# Patient Record
Sex: FEMALE | Race: WHITE | NOT HISPANIC OR LATINO | URBAN - METROPOLITAN AREA
[De-identification: names, ages, dates, MRNs, and addresses within clinical notes are randomized per-mention and may not be internally consistent; named-entity substitution may affect disease eponyms.]

---

## 2017-09-21 ENCOUNTER — OFFICE VISIT (OUTPATIENT)
Dept: OPHTHALMOLOGY | Facility: CLINIC | Age: 57
End: 2017-09-21
Attending: OPHTHALMOLOGY
Payer: MEDICARE

## 2017-09-21 DIAGNOSIS — Z79.4 TYPE 2 DIABETES MELLITUS WITH RIGHT EYE AFFECTED BY MILD NONPROLIFERATIVE RETINOPATHY WITHOUT MACULAR EDEMA, WITH LONG-TERM CURRENT USE OF INSULIN (H): Primary | ICD-10-CM

## 2017-09-21 DIAGNOSIS — E11.3291 TYPE 2 DIABETES MELLITUS WITH RIGHT EYE AFFECTED BY MILD NONPROLIFERATIVE RETINOPATHY WITHOUT MACULAR EDEMA, WITH LONG-TERM CURRENT USE OF INSULIN (H): Primary | ICD-10-CM

## 2017-09-21 PROCEDURE — 92015 DETERMINE REFRACTIVE STATE: CPT | Mod: GY,ZF

## 2017-09-21 PROCEDURE — 99213 OFFICE O/P EST LOW 20 MIN: CPT | Mod: ZF

## 2017-09-21 PROCEDURE — 92134 CPTRZ OPH DX IMG PST SGM RTA: CPT | Mod: ZF | Performed by: OPHTHALMOLOGY

## 2017-09-21 ASSESSMENT — VISUAL ACUITY
OS_CC: 20/25
METHOD: SNELLEN - LINEAR
CORRECTION_TYPE: GLASSES
OS_CC+: -1
OD_CC: 20/20

## 2017-09-21 ASSESSMENT — SLIT LAMP EXAM - LIDS
COMMENTS: DERMATOCHALASIS
COMMENTS: DERMATOCHALASIS

## 2017-09-21 ASSESSMENT — REFRACTION_MANIFEST
OD_AXIS: 010
OS_AXIS: 170
OD_ADD: +2.25
OS_SPHERE: -0.50
OD_CYLINDER: +0.25
OS_CYLINDER: +1.00
OD_SPHERE: PLANO
OS_ADD: +2.25

## 2017-09-21 ASSESSMENT — REFRACTION_WEARINGRX
OS_AXIS: 145
OS_CYLINDER: +0.50
OD_CYLINDER: +0.25
OD_SPHERE: PLANO
OS_ADD: +1.75
OD_ADD: +1.75
OS_SPHERE: -0.50
OD_AXIS: 005

## 2017-09-21 ASSESSMENT — EXTERNAL EXAM - RIGHT EYE: OD_EXAM: NORMAL

## 2017-09-21 ASSESSMENT — CONF VISUAL FIELD
METHOD: COUNTING FINGERS
OD_NORMAL: 1
OS_NORMAL: 1

## 2017-09-21 ASSESSMENT — CUP TO DISC RATIO
OS_RATIO: 0.3
OD_RATIO: 0.2

## 2017-09-21 ASSESSMENT — TONOMETRY
OS_IOP_MMHG: 15
IOP_METHOD: TONOPEN
OD_IOP_MMHG: 16

## 2017-09-21 ASSESSMENT — EXTERNAL EXAM - LEFT EYE: OS_EXAM: NORMAL

## 2017-09-21 NOTE — LETTER
9/21/2017       RE: Helena Albright  [address]   [place] [address]     Dear Colleague,    Thank you for referring your patient, Helena Albright, to the EYE CLINIC at [facility]. Please see a copy of my visit note below.    I have confirmed the patient's and reviewed Past Medical History, Past Surgical History, Social History, Family History, Problem List, Medication List and agree with Tech note.    CC: Diabetic Eye Exam    HPI: Helena Albirght is a 57 year old female who presents for diabetic eye exam. Her last eye exam was 1 year ago.     She is status post gastric bypass surgery and now says she is borderline diabetic.    A1C was 8.4% 11/16. her blood sugar was 150s this am. She is currently on quetiapine/Seroquel for bipolar disorder, which caused her to gain weight recently.     She reports some intermittent blurriness. Endorses some glare. Difficulty reading.  Needs tri-focal     POHx:  None    Assessment & Plan     1. Diabetic Eye Exam   - new signs of diabetic retinopathy mild nonproliferative diabetic retinopathy right eye but no Diabetic macular edema    - continue blood glucose and blood pressure control.  Is using insulin now   - A1C IS 8.4 Nov 2016.    2. Cataracts, Both Eyes   - likely visually significant    - new prescription glasses issued today with tri-focal    - discussed cataract surgery with patient, will continue to observe for now     3. Refractive Error: Hyperopia with Astigmatism; Presbyopia    Follow-Up    Return to clinic six months      Tabby Britton MD PhD.  Professor & Chair.

## 2017-09-21 NOTE — MR AVS SNAPSHOT
After Visit Summary   9/21/2017    Helena Albright    MRN: [id number]           Patient Information     Date Of Birth          1960        Visit Information        Provider Department      9/21/2017 1:00 PM Tabby Leslie MD Eye Clinic        Today's Diagnoses     Type 2 diabetes mellitus with right eye affected by mild nonproliferative retinopathy without macular edema, with long-term current use of insulin (H)    -  1       Follow-ups after your visit        Follow-up notes from your care team     Return in about 6 months (around 3/21/2018) for Diabetic exam, Exam & OCT OU.      Your next 10 appointments already scheduled     Sep 26, 2017  3:00 PM CDT   (Arrive by 2:45 PM)   Return Visit with Deisy Barr MD   [facility] ([facility])    [address]  [address] [facility] [place] [address] [phone]            Mar 19, 2018  1:00 PM CDT   RETURN RETINA with Tabby Leslie MD   Eye Clinic ([facility])    [address]  [address]  [place] [address] [phone]              Future tests that were ordered for you today     Open Future Orders        Priority Expected Expires Ordered    OCT Retina Spectralis OU (both eyes) Routine  3/25/2019 9/21/2017            Who to contact     Please call your clinic at [phone] to:    Ask questions about your health    Make or cancel appointments    Discuss your medicines    Learn about your test results    Speak to your doctor   If you have compliments or concerns about an experience at your clinic, or if you wish to file a complaint, please contact [facility] Patient Relations at [phone] or email us at [email or web address]         Additional Information About Your Visit        MyChart Information     [facility] gives you secure access to your electronic health record.  If you see a primary care provider, you can also send messages to your care team and make appointments. If you have questions, please call your primary care clinic.  If you do not have a primary care provider, please call [phone] and they will assist you.      [employer] is an electronic gateway that provides easy, online access to your medical records. With [employer], you can request a clinic appointment, read your test results, renew a prescription or communicate with your care team.     To access your existing account, please contact your [facility] or call [phone] for assistance.        Care EveryWhere ID     This is your Care EveryWhere ID. This could be used by other organizations to access your [facility] medical records  [id number]         Blood Pressure from Last 3 Encounters:   11/28/16 130/86   09/30/16 115/65   09/23/16 109/62    Weight from Last 3 Encounters:   11/28/16 93.9 kg (207 lb)   09/23/16 93.4 kg (206 lb)   09/16/16 94.3 kg (208 lb)              We Performed the Following     OCT Retina Spectralis OU (both eyes)          Today's Medication Changes          These changes are accurate as of: 9/21/17  2:06 PM.  If you have any questions, ask your nurse or doctor.               These medicines have changed or have updated prescriptions.        Dose/Directions    carBAMazepine 100 MG/5ML suspension   Commonly known as:  TEGretol   Indication:  Manic-Depression   This may have changed:    - how much to take  - additional instructions   Used for:  Bipolar affective disorder (H)        Dose:  800 mg   Take 40 mLs by mouth every evening. Indications: Manic-Depression   Quantity:  450 mL   Refills:  0       clonazePAM 1 MG ODT tab   Commonly known as:  klonoPIN   This may have changed:    - when to take this  - reasons to take this  - additional instructions   Used for:  Bipolar affective disorder (H)        Dose:  2 mg   Take 2 tablets by mouth At Bedtime.   Quantity:   60 tablet   Refills:  0       traZODone 5 mg/ml Susp   Commonly known as:  DESYREL   This may have changed:  how much to take   Used for:  Insomnia        Dose:  100 mg   Take 20 mLs by mouth At Bedtime.   Quantity:  400 mL   Refills:  0                Primary Care Provider Office Phone # Fax #    Deisy Killian Patricia Barr [phone]       [address] [place] [address] [phone]  [facility] [address]        Equal Access to Services     JAYNE MCBRIDE AH: Hadii aad ku hadasho Soomaali, waaxda luqadaha, qaybta kaalmada adeegyada, waxay idiin hayaan adeeg kharash la'ivone . So [facility] [phone].    ATENCIÓN: Si habla español, tiene a vines disposición servicios gratuitos de asistencia lingüística. [facility] [phone].    We comply with applicable federal civil rights laws and [place] laws. We do not discriminate on the basis of race, color, national origin, age, disability sex, sexual orientation or gender identity.            Thank you!     Thank you for choosing EYE CLINIC  for your care. Our goal is always to provide you with excellent care. Hearing back from our patients is one way we can continue to improve our services. Please take a few minutes to complete the written survey that you may receive in the mail after your visit with us. Thank you!             Your Updated Medication List - Protect others around you: Learn how to safely use, store and throw away your medicines at www.disposemymeds.org.          This list is accurate as of: 9/21/17  2:06 PM.  Always use your most recent med list.                   Brand Name Dispense Instructions for use Diagnosis    blood glucose monitoring test strip    no brand specified    1 Box    Use to test blood sugars 4 times daily or as directed    Type 2 diabetes mellitus without complication, with long-term current use of insulin (H)       carBAMazepine 100 MG/5ML suspension    TEGretol    450 mL    Take 40 mLs by mouth every evening. Indications: Manic-Depression     Bipolar affective disorder (H)       clonazePAM 1 MG ODT tab    klonoPIN    60 tablet    Take 2 tablets by mouth At Bedtime.    Bipolar affective disorder (H)       * cyanocobalamin 1000 MCG/ML injection    VITAMIN B12    1 mL    Inject 1 mL (1,000 mcg) into the muscle every 30 days    Status post bariatric surgery       * cyanocobalamin 1000 MCG/ML injection    VITAMIN B12    0.9 mL    Inject 1 mL (1,000 mcg) into the muscle every 30 days    Vitamin B12 deficiency (non anemic)       insulin glargine 100 UNIT/ML injection    LANTUS SOLOSTAR    3 Month    16 units at bedtime    Type 2 diabetes mellitus without complication, with long-term current use of insulin (H)       insulin pen needle 31G X 8 MM    B-D U/F    100 each    Use 2 pen needles daily or as directed.    Type 2 diabetes mellitus without complication, with long-term current use of insulin (H), Morbid obesity due to excess calories (H)       levofloxacin 0.5 % ophthalmic solution    QUIXIN    2 Bottle    1 drop in surgical eye as directed - 4x daily for 1 week, then stop    Cataracts, both eyes       order for DME     12 each    Injection Supplies for Vitamin B12: 3cc syringes w/ 27 gauge needles, 1/2 inch length    Status post bariatric surgery       prednisoLONE acetate 1 % ophthalmic susp    PRED FORTE    2 Bottle    1 drop in surgical eye as directed, 4x daily after surgery for 1 week, 3x daily for 1 week, 2x daily for 1 week, daily for 1 week, then stop    Cataracts, both eyes       SEROQUEL PO      Take 300 mg by mouth At Bedtime        traZODone 5 mg/ml Susp    DESYREL    400 mL    Take 20 mLs by mouth At Bedtime.    Insomnia       * Notice:  This list has 2 medication(s) that are the same as other medications prescribed for you. Read the directions carefully, and ask your doctor or other care provider to review them with you.

## 2017-09-21 NOTE — PROGRESS NOTES
I have confirmed the patient's and reviewed Past Medical History, Past Surgical History, Social History, Family History, Problem List, Medication List and agree with Tech note.    CC: Diabetic Eye Exam    HPI: Helena Albright is a 57 year old female who presents for diabetic eye exam. Her last eye exam was 1 year ago.     She is status post gastric bypass surgery and now says she is borderline diabetic.    A1C was 8.4% 11/16. her blood sugar was 150s this am. She is currently on quetiapine/Seroquel for bipolar disorder, which caused her to gain weight recently.     She reports some intermittent blurriness. Endorses some glare. Difficulty reading.  Needs tri-focal     POHx:  None    Assessment & Plan     1. Diabetic Eye Exam   - new signs of diabetic retinopathy mild nonproliferative diabetic retinopathy right eye but no Diabetic macular edema    - continue blood glucose and blood pressure control.  Is using insulin now   - A1C IS 8.4 Nov 2016.    2. Pseudophakia both eyes    - new prescription glasses issued today with tri-focal         3. Refractive Error: Hyperopia with Astigmatism; Presbyopia    Follow-Up    Return to clinic six months      Tabby Britton MD PhD.  Professor & Chair.

## 2017-09-21 NOTE — NURSING NOTE
Chief Complaints and History of Present Illnesses   Patient presents with     Eye Exam For Diabetes     HPI    Affected eye(s):  Both   Symptoms:        Frequency:  Constant       Do you have eye pain now?:  No      Comments:  States that she has 4-5 spots on the retina in May.  Had full exam at that time as well  States that the va is not good at arms length.  But she does not have Trifocals  BS 85  Lab Results       Component                Value               Date         A1C                     10.4                5/2017                 A1C                      8.4                 11/28/2016                 A1C                      7.8                 07/29/2016                 A1C                      7.3                 09/24/2015                 A1C                      9.7                 04/04/2012                 A1C                      7.7                 12/19/2011      No F&F  Shannon Long COT 12:53 PM September 21, 2017

## 2017-09-26 ENCOUNTER — TELEPHONE (OUTPATIENT)
Dept: GASTROENTEROLOGY | Facility: CLINIC | Age: 57
End: 2017-09-26

## 2017-09-26 ENCOUNTER — OFFICE VISIT (OUTPATIENT)
Dept: INTERNAL MEDICINE | Facility: CLINIC | Age: 57
End: 2017-09-26

## 2017-09-26 VITALS
DIASTOLIC BLOOD PRESSURE: 87 MMHG | HEART RATE: 60 BPM | BODY MASS INDEX: 33.15 KG/M2 | SYSTOLIC BLOOD PRESSURE: 126 MMHG | WEIGHT: 205.4 LBS

## 2017-09-26 DIAGNOSIS — Z98.84 STATUS POST BARIATRIC SURGERY: ICD-10-CM

## 2017-09-26 DIAGNOSIS — Z12.11 SPECIAL SCREENING FOR MALIGNANT NEOPLASMS, COLON: ICD-10-CM

## 2017-09-26 DIAGNOSIS — F31.70 BIPOLAR AFFECTIVE DISORDER IN REMISSION (H): ICD-10-CM

## 2017-09-26 DIAGNOSIS — E11.9 TYPE 2 DIABETES MELLITUS WITHOUT COMPLICATION, WITH LONG-TERM CURRENT USE OF INSULIN (H): ICD-10-CM

## 2017-09-26 DIAGNOSIS — E11.9 TYPE 2 DIABETES MELLITUS WITHOUT COMPLICATION, WITH LONG-TERM CURRENT USE OF INSULIN (H): Primary | ICD-10-CM

## 2017-09-26 DIAGNOSIS — Z23 NEED FOR PNEUMOCOCCAL VACCINATION: ICD-10-CM

## 2017-09-26 DIAGNOSIS — Z71.89 ADVANCED DIRECTIVES, COUNSELING/DISCUSSION: ICD-10-CM

## 2017-09-26 DIAGNOSIS — Z79.4 TYPE 2 DIABETES MELLITUS WITHOUT COMPLICATION, WITH LONG-TERM CURRENT USE OF INSULIN (H): ICD-10-CM

## 2017-09-26 DIAGNOSIS — Z12.31 ENCOUNTER FOR SCREENING MAMMOGRAM FOR BREAST CANCER: ICD-10-CM

## 2017-09-26 DIAGNOSIS — Z13.89 SCREENING FOR DIABETIC PERIPHERAL NEUROPATHY: ICD-10-CM

## 2017-09-26 DIAGNOSIS — F51.01 PRIMARY INSOMNIA: ICD-10-CM

## 2017-09-26 DIAGNOSIS — Z79.4 TYPE 2 DIABETES MELLITUS WITHOUT COMPLICATION, WITH LONG-TERM CURRENT USE OF INSULIN (H): Primary | ICD-10-CM

## 2017-09-26 DIAGNOSIS — Z23 NEED FOR INFLUENZA VACCINATION: ICD-10-CM

## 2017-09-26 DIAGNOSIS — R39.15 URINARY URGENCY: ICD-10-CM

## 2017-09-26 DIAGNOSIS — B82.0 ROUNDWORM INFECTION: ICD-10-CM

## 2017-09-26 LAB
ALBUMIN UR-MCNC: NEGATIVE MG/DL
ALT SERPL W P-5'-P-CCNC: 24 U/L (ref 0–50)
APPEARANCE UR: CLEAR
BILIRUB UR QL STRIP: NEGATIVE
CHOLEST SERPL-MCNC: 216 MG/DL
COLOR UR AUTO: YELLOW
CREAT SERPL-MCNC: 0.62 MG/DL (ref 0.52–1.04)
CREAT UR-MCNC: 99 MG/DL
GFR SERPL CREATININE-BSD FRML MDRD: >90 ML/MIN/1.7M2
GLUCOSE UR STRIP-MCNC: NEGATIVE MG/DL
HBA1C MFR BLD: 7.3 % (ref 4.3–6)
HDLC SERPL-MCNC: 46 MG/DL
HGB UR QL STRIP: NEGATIVE
KETONES UR STRIP-MCNC: NEGATIVE MG/DL
LDLC SERPL CALC-MCNC: 119 MG/DL
LEUKOCYTE ESTERASE UR QL STRIP: NEGATIVE
MICROALBUMIN UR-MCNC: 6 MG/L
MICROALBUMIN/CREAT UR: 5.98 MG/G CR (ref 0–25)
MUCOUS THREADS #/AREA URNS LPF: PRESENT /LPF
NITRATE UR QL: NEGATIVE
NONHDLC SERPL-MCNC: 171 MG/DL
PH UR STRIP: 5 PH (ref 5–7)
RBC #/AREA URNS AUTO: <1 /HPF (ref 0–2)
SOURCE: ABNORMAL
SP GR UR STRIP: 1.02 (ref 1–1.03)
SQUAMOUS #/AREA URNS AUTO: <1 /HPF (ref 0–1)
TRIGL SERPL-MCNC: 258 MG/DL
UROBILINOGEN UR STRIP-MCNC: 0 MG/DL (ref 0–2)
WBC #/AREA URNS AUTO: 1 /HPF (ref 0–2)

## 2017-09-26 RX ORDER — CLONAZEPAM 1 MG/1
2 TABLET, ORALLY DISINTEGRATING ORAL AT BEDTIME
Qty: 60 TABLET | Refills: 0 | Status: SHIPPED | OUTPATIENT
Start: 2017-10-03 | End: 2017-10-27

## 2017-09-26 RX ORDER — CYANOCOBALAMIN 1000 UG/ML
1 INJECTION, SOLUTION INTRAMUSCULAR; SUBCUTANEOUS
Qty: 1 ML | Refills: 11 | Status: SHIPPED | OUTPATIENT
Start: 2017-09-26 | End: 2017-10-27

## 2017-09-26 RX ORDER — CARBAMAZEPINE 200 MG/1
800 TABLET ORAL AT BEDTIME
Qty: 120 TABLET | Refills: 0 | Status: SHIPPED | OUTPATIENT
Start: 2017-09-26 | End: 2017-10-26

## 2017-09-26 RX ORDER — IVERMECTIN 3 MG/1
18.5 TABLET ORAL ONCE
Qty: 1 TABLET | Refills: 0 | Status: SHIPPED | OUTPATIENT
Start: 2017-09-26 | End: 2017-09-26

## 2017-09-26 RX ORDER — TRAZODONE HYDROCHLORIDE 150 MG/1
300 TABLET ORAL AT BEDTIME
Qty: 60 TABLET | Refills: 0 | Status: SHIPPED | OUTPATIENT
Start: 2017-09-26 | End: 2017-10-27

## 2017-09-26 RX ORDER — QUETIAPINE FUMARATE 300 MG/1
300 TABLET, FILM COATED ORAL AT BEDTIME
Qty: 30 TABLET | Refills: 0 | Status: SHIPPED | OUTPATIENT
Start: 2017-09-26 | End: 2017-10-26

## 2017-09-26 RX ORDER — ATORVASTATIN CALCIUM 40 MG/1
40 TABLET, FILM COATED ORAL DAILY
Qty: 90 TABLET | Refills: 3 | Status: SHIPPED | OUTPATIENT
Start: 2017-09-26 | End: 2017-12-21

## 2017-09-26 RX ORDER — OXYBUTYNIN CHLORIDE 5 MG/1
5 TABLET, EXTENDED RELEASE ORAL DAILY
Qty: 90 TABLET | Refills: 3 | Status: SHIPPED | OUTPATIENT
Start: 2017-09-26 | End: 2017-12-21

## 2017-09-26 RX ORDER — IVERMECTIN 3 MG/1
18.5 TABLET ORAL ONCE
Qty: 6 TABLET | Refills: 0 | Status: SHIPPED | OUTPATIENT
Start: 2017-09-26 | End: 2017-09-26
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
## 2017-09-26 NOTE — NURSING NOTE
Injectable Influenza Immunization Documentation      1.  Has the patient received the information for the injectable influenza vaccine? YES    2. Is the patient 6 months of age or older? YES    3. Does the patient have any of the following contraindications?          Severe allergy to eggs? No     Severe allergic reaction to previous influenza vaccines? No     Allergy to contact lens solution/thimerosol? No     History of Guillain-[place] syndrome? No     Undergoing chemotherapy or radiation therapy?       (vaccine should be given at least 2 weeks prior or 3 weeks after)  No     Currently have moderate or severe illness? No         4.  The vaccine has been administered and the patient was instructed to wait 15 minutes before leaving the building in the event of an allergic reaction: YES    Administered Influenza Fluzone Quadrivalent, Pneumococcal Prevnar 13, and Vitamin B 12 (see Immunizations in Chart Review). Patient tolerated well.        Karri Peña CMA at 4:00 PM on 9/26/2017

## 2017-09-26 NOTE — NURSING NOTE
Chief Complaint   Patient presents with     Consult     Patient here to discuss advance directive.     Diabetes     Patient here for diabetes.       Louis Ibarra CMA at 2:52 PM on 9/26/2017.

## 2017-09-26 NOTE — PROGRESS NOTES
Diabetic Foot Screen:  Any complaints of increased pain or numbness ? No  Is there a foot ulcer now or a history of foot ulcer? No  Does the foot have an abnormal shape? No  Are the nails thick, too long or ingrown? No  Are there any redness or open areas? 2 old blisters anterior of right foot, and 1 old blister anterior of left foot.          Sensation Testing done at all points on the diagram with monofilament     Right Foot: Sensation Normal at all points  Left Foot: Sensation Normal at all points     Risk Category: 0- No loss of protective sensation  Performed by  Karri Peña CMA ([facility]) at 4:46 PM on 9/26/2017

## 2017-09-26 NOTE — MR AVS SNAPSHOT
After Visit Summary   9/26/2017    Helena Albright    MRN: [id number]           Patient Information     Date Of Birth          1960        Visit Information        Provider Department      9/26/2017 3:00 PM Deisy Scott MD [facility]        Today's Diagnoses     Type 2 diabetes mellitus without complication, with long-term current use of insulin (H)    -  1    Roundworm infection        Urinary urgency        Status post bariatric surgery        Bipolar affective disorder in remission (H)        Primary insomnia        Need for influenza vaccination        Special screening for malignant neoplasms, colon        Encounter for screening mammogram for breast cancer        Need for pneumococcal vaccination          Care Instructions    Primary Care Center: [phone]     Primary Care Center Medication Refill Request Information:  * Please contact your pharmacy regarding ANY request for medication refills.  ** [facility] Prescription Fax = [phone]  * Please allow 3 business days for routine medication refills.  * Please allow 5 business days for controlled substance medication refills.     Primary Care Center Test Result notification information:  *You will be notified with in 7-10 days of your appointment day regarding the results of your test.  If you are on MyChart you will be notified as soon as the provider has reviewed the results and signed off on them.    You will be contacted for Colonoscopy procedure. Please call if not heard from them at  [phone]            Follow-ups after your visit        Additional Services     GI Procedure Referral       Last Lab Result: Creatinine (mg/dL)       Date                     Value                 07/29/2016               0.62             ----------  Body mass index is 33.15 kg/(m^2).     Needed:  No  Language:  English    Patient will be contacted to schedule procedure.     Please be aware that coverage of  these services is subject to the terms and limitations of your health insurance plan.  Call member services at your health plan with any benefit or coverage questions.  Any procedures must be performed at a [place] facility OR coordinated by your clinic's referral office.    Please bring the following with you to your appointment:    (1) Any X-Rays, CTs or MRIs which have been performed.  Contact the facility where they were done to arrange for  prior to your scheduled appointment.    (2) List of current medications   (3) This referral request   (4) Any documents/labs given to you for this referral                  Your next 10 appointments already scheduled     Sep 26, 2017  4:00 PM CDT   LAB with  LAB   [facility] ([facility])    [address] [place] [id number] [phone]           Patient must bring picture ID. Patient should be prepared to give a urine specimen  Please do not eat 10-12 hours before your appointment if you are coming in fasting for labs on lipids, cholesterol, or glucose (sugar). Pregnant women should follow their Care Team instructions. Water with medications is okay. Do not drink coffee or other fluids. If you have concerns about taking  your medications, please ask at office or if scheduling via [employer], send a message by clicking on Secure Messaging, Message Your Care Team.            Oct 04, 2017  2:00 PM CDT   (Arrive by 1:45 PM)   MA SCREENING DIGITAL BILATERAL with [id number]   [facility] ([facility])    [address] [place] [id number] [phone]           Do not use any powder, lotion or deodorant under your arms or on your breast. If you do, we will ask you to remove it before your exam.  Wear comfortable, two-piece clothing.  If you have any allergies, tell your care team.  Bring any previous mammograms from other facilities or have them mailed to  "the breast center. Three-dimensional (3D) mammograms are available at [place] locations in [place], [place], [place], [place], [facility], [place], [place], and [place]. -Health locations include [place] [facility] in [place]. Benefits of 3D mammograms include: - Improved rate of cancer detection - Decreases your chance of having to go back for more tests, which means fewer: - \"False-positive\" results (This means that there is an abnormal area but it isn't cancer.) - Invasive testing procedures, such as a biopsy or surgery - Can provide clearer images of the breast if you have dense breast tissue. 3D mammography is an optional exam that anyone can have with a 2D mammogram. It doesn't replace or take the place of a 2D mammogram. 2D mammograms remain an effective screening test for all women.  Not all insurance companies cover the cost of a 3D mammogram. Check with your insurance.            Mar 19, 2018  1:00 PM CDT   RETURN RETINA with Tabby Leslie MD   Eye Clinic ([facility])    [address]  [address]  [facility] [address]   [phone]              Future tests that were ordered for you today     Open Future Orders        Priority Expected Expires Ordered    MA Screening Digital Bilateral Routine  9/26/2018 9/26/2017    ALT (Today) Routine 9/26/2017 9/26/2018 9/26/2017    CREATININE (Today) Routine 9/26/2017 9/26/2018 9/26/2017    HEMOGLOBIN A1C -(Today) Routine 9/26/2017 9/26/2018 9/26/2017    Lipid panel reflex to direct LDL - -(Today) Routine 9/26/2017 9/26/2018 9/26/2017    Albumin Random Urine Quantitative with Creat Ratio Routine 9/26/2017 9/26/2018 9/26/2017    UA with Micro reflex to Culture Routine 9/26/2017 9/26/2018 9/26/2017            Who to contact     Please call your clinic at [phone] to:    Ask questions about your health    Make or cancel appointments    Discuss your medicines    Learn about " your test results    Speak to your doctor   If you have compliments or concerns about an experience at your clinic, or if you wish to file a complaint, please contact [facility] Patient Relations at [phone] or email us at [email or web address]         Additional Information About Your Visit        [employer] Information     [employer] gives you secure access to your electronic health record. If you see a primary care provider, you can also send messages to your care team and make appointments. If you have questions, please call your primary care clinic.  If you do not have a primary care provider, please call [phone] and they will assist you.      [employer] is an electronic gateway that provides easy, online access to your medical records. With [employer], you can request a clinic appointment, read your test results, renew a prescription or communicate with your care team.     To access your existing account, please contact your [facility] or call [phone] for assistance.        Care EveryWhere ID     This is your Care EveryWhere ID. This could be used by other organizations to access your [facility] medical records  [id number]        Your Vitals Were     Pulse Breastfeeding? BMI (Body Mass Index)             60 No 33.15 kg/m2          Blood Pressure from Last 3 Encounters:   09/26/17 126/87   11/28/16 130/86   09/30/16 115/65    Weight from Last 3 Encounters:   09/26/17 93.2 kg (205 lb 6.4 oz)   11/28/16 93.9 kg (207 lb)   09/23/16 93.4 kg (206 lb)              We Performed the Following     FLU VACCINE, AGE >= 3 YR     GI Procedure Referral     Pneumococcal vaccine 13 valent PCV13 IM (Prevnar) [05069]          Today's Medication Changes          These changes are accurate as of: 9/26/17  3:49 PM.  If you have any questions, ask your nurse or doctor.               Start taking these medicines.        Dose/Directions    aspirin 81 MG tablet    Used for:  Type 2 diabetes mellitus without complication, with long-term current use of insulin (H)   Started by:  Deisy Scott MD        Dose:  81 mg   Take 1 tablet (81 mg) by mouth daily   Quantity:  90 tablet   Refills:  3       atorvastatin 40 MG tablet   Commonly known as:  LIPITOR   Used for:  Type 2 diabetes mellitus without complication, with long-term current use of insulin (H)   Started by:  Deisy Scott MD        Dose:  40 mg   Take 1 tablet (40 mg) by mouth daily   Quantity:  90 tablet   Refills:  3       carBAMazepine 200 MG tablet   Commonly known as:  TEGretol   Used for:  Bipolar affective disorder in remission (H)   Replaces:  carBAMazepine 100 MG/5ML suspension   Started by:  Deisy Scott MD        Dose:  800 mg   Take 4 tablets (800 mg) by mouth At Bedtime   Quantity:  120 tablet   Refills:  0       ivermectin 3 MG Tabs tablet   Commonly known as:  STROMECTOL   Used for:  Roundworm infection   Started by:  Deisy Scott MD        Dose:  18.5 mg   Take 6 tablets (18 mg) by mouth once for 1 dose   Quantity:  1 tablet   Refills:  0       oxybutynin 5 MG 24 hr tablet   Commonly known as:  DITROPAN-XL   Used for:  Urinary urgency   Started by:  Deisy Scott MD        Dose:  5 mg   Take 1 tablet (5 mg) by mouth daily   Quantity:  90 tablet   Refills:  3       traZODone 150 MG tablet   Commonly known as:  DESYREL   Used for:  Primary insomnia   Replaces:  traZODone 5 mg/ml Susp   Started by:  Deisy Scott MD        Dose:  300 mg   Take 2 tablets (300 mg) by mouth At Bedtime   Quantity:  60 tablet   Refills:  0         These medicines have changed or have updated prescriptions.        Dose/Directions    clonazePAM 1 MG ODT tab   Commonly known as:  klonoPIN   This may have changed:    - when to take this  - reasons to take this  - additional instructions   Used for:  Bipolar affective disorder in remission  (H)        Dose:  2 mg   Start taking on:  10/3/2017   Take 2 tablets (2 mg) by mouth At Bedtime   Quantity:  60 tablet   Refills:  0       cyanocobalamin 1000 MCG/ML injection   Commonly known as:  VITAMIN B12   This may have changed:  Another medication with the same name was removed. Continue taking this medication, and follow the directions you see here.   Used for:  Status post bariatric surgery   Changed by:  Deisy Scott MD        Dose:  1 mL   Inject 1 mL (1,000 mcg) into the muscle every 30 days   Quantity:  1 mL   Refills:  11       insulin glargine 100 UNIT/ML injection   Commonly known as:  LANTUS SOLOSTAR   This may have changed:    - how much to take  - how to take this  - when to take this  - additional instructions   Used for:  Type 2 diabetes mellitus without complication, with long-term current use of insulin (H)   Changed by:  Deisy Scott MD        Dose:  20 Units   Inject 20 Units Subcutaneous At Bedtime   Quantity:  3 mL   Refills:  3       QUEtiapine 300 MG tablet   Commonly known as:  SEROQUEL   This may have changed:  medication strength   Used for:  Bipolar affective disorder in remission (H)   Changed by:  Deisy Scott MD        Dose:  300 mg   Take 1 tablet (300 mg) by mouth At Bedtime   Quantity:  30 tablet   Refills:  0         Stop taking these medicines if you haven't already. Please contact your care team if you have questions.     carBAMazepine 100 MG/5ML suspension   Commonly known as:  TEGretol   Replaced by:  carBAMazepine 200 MG tablet   Stopped by:  Deisy Scott MD           levofloxacin 0.5 % ophthalmic solution   Commonly known as:  QUIXIN   Stopped by:  Deisy Scott MD           traZODone 5 mg/ml Susp   Commonly known as:  DESYREL   Replaced by:  traZODone 150 MG tablet   Stopped by:  Deisy Scott MD                Where to get your medicines      [facility]  [id number] - [place], [place] - [address] [place] [address] AT [place] [facility]  [address], [place] [address]     Phone:  [phone]     aspirin 81 MG tablet    atorvastatin 40 MG tablet    carBAMazepine 200 MG tablet    cyanocobalamin 1000 MCG/ML injection    insulin glargine 100 UNIT/ML injection    ivermectin 3 MG Tabs tablet    oxybutynin 5 MG 24 hr tablet    QUEtiapine 300 MG tablet    traZODone 150 MG tablet         Some of these will need a paper prescription and others can be bought over the counter.  Ask your nurse if you have questions.     Bring a paper prescription for each of these medications     clonazePAM 1 MG ODT tab                Primary Care Provider Office Phone # Fax #    Deisy Maria D Barr [phone]       [address] [place] [address]  [facility] [address]        Equal Access to Services     [facility] : Hadii oleksandr recio hadasho Sovale, waaxda luqadaha, qaybta kaalmada adeegyada, waxay colin haydmn cinthia bermudez . So [facility] [phone].    ATENCIÓN: Si habla español, tiene a vines disposición servicios gratuitos de asistencia lingüística. Agustín al [phone].    We comply with applicable federal civil rights laws and [place] laws. We do not discriminate on the basis of race, color, national origin, age, disability sex, sexual orientation or gender identity.            Thank you!     Thank you for choosing [facility]  for your care. Our goal is always to provide you with excellent care. Hearing back from our patients is one way we can continue to improve our services. Please take a few minutes to complete the written survey that you may receive in the mail after your visit with us. Thank you!             Your Updated Medication List - Protect others around you: Learn how to safely use, store and throw away your medicines at www.disposemymeds.org.          This list is accurate as of: 9/26/17  3:49 PM.  Always use your most  recent med list.                   Brand Name Dispense Instructions for use Diagnosis    aspirin 81 MG tablet     90 tablet    Take 1 tablet (81 mg) by mouth daily    Type 2 diabetes mellitus without complication, with long-term current use of insulin (H)       atorvastatin 40 MG tablet    LIPITOR    90 tablet    Take 1 tablet (40 mg) by mouth daily    Type 2 diabetes mellitus without complication, with long-term current use of insulin (H)       blood glucose monitoring test strip    no brand specified    1 Box    Use to test blood sugars 4 times daily or as directed    Type 2 diabetes mellitus without complication, with long-term current use of insulin (H)       carBAMazepine 200 MG tablet    TEGretol    120 tablet    Take 4 tablets (800 mg) by mouth At Bedtime    Bipolar affective disorder in remission (H)       clonazePAM 1 MG ODT tab   Start taking on:  10/3/2017    klonoPIN    60 tablet    Take 2 tablets (2 mg) by mouth At Bedtime    Bipolar affective disorder in remission (H)       cyanocobalamin 1000 MCG/ML injection    VITAMIN B12    1 mL    Inject 1 mL (1,000 mcg) into the muscle every 30 days    Status post bariatric surgery       insulin glargine 100 UNIT/ML injection    LANTUS SOLOSTAR    3 mL    Inject 20 Units Subcutaneous At Bedtime    Type 2 diabetes mellitus without complication, with long-term current use of insulin (H)       insulin pen needle 31G X 8 MM    B-D U/F    100 each    Use 2 pen needles daily or as directed.    Type 2 diabetes mellitus without complication, with long-term current use of insulin (H), Morbid obesity due to excess calories (H)       ivermectin 3 MG Tabs tablet    STROMECTOL    1 tablet    Take 6 tablets (18 mg) by mouth once for 1 dose    Roundworm infection       order for DME     12 each    Injection Supplies for Vitamin B12: 3cc syringes w/ 27 gauge needles, 1/2 inch length    Status post bariatric surgery       oxybutynin 5 MG 24 hr tablet    DITROPAN-XL    90 tablet     Take 1 tablet (5 mg) by mouth daily    Urinary urgency       prednisoLONE acetate 1 % ophthalmic susp    PRED FORTE    2 Bottle    1 drop in surgical eye as directed, 4x daily after surgery for 1 week, 3x daily for 1 week, 2x daily for 1 week, daily for 1 week, then stop    Cataracts, both eyes       QUEtiapine 300 MG tablet    SEROQUEL    30 tablet    Take 1 tablet (300 mg) by mouth At Bedtime    Bipolar affective disorder in remission (H)       traZODone 150 MG tablet    DESYREL    60 tablet    Take 2 tablets (300 mg) by mouth At Bedtime    Primary insomnia

## 2017-09-26 NOTE — PATIENT INSTRUCTIONS
[facility]: [phone]     [facility] Medication Refill Request Information:  * Please contact your pharmacy regarding ANY request for medication refills.  ** [facility] Prescription Fax = [phone]  * Please allow 3 business days for routine medication refills.  * Please allow 5 business days for controlled substance medication refills.     [facility] Test Result notification information:  *You will be notified with in 7-10 days of your appointment day regarding the results of your test.  If you are on MyChart you will be notified as soon as the provider has reviewed the results and signed off on them.    You will be contacted for Colonoscopy procedure. Please call if not heard from them at  [phone]

## 2017-09-27 NOTE — TELEPHONE ENCOUNTER
QUEtiapine (SEROQUEL) 300 MG tablet  Lab Results   Component Value Date    A1C 7.3 09/26/2017    A1C 8.4 11/28/2016    A1C 7.8 07/29/2016    A1C 7.3 09/24/2015    A1C 9.7 04/04/2012     BP Readings from Last 3 Encounters:   09/26/17 126/87   11/28/16 130/86   09/30/16 115/65     Recent Labs   Lab Test  09/26/17   1620  07/29/16   1120  09/24/15   0951  09/24/12   0734   CHOL  216*  231*  242*  218*   HDL  46*  47*  48*  39*   LDL  119*  112*  139*  119   TRIG  258*  362*  278*  298*   CHOLHDLRATIO   --    --   5.0  5.6*     traZODone (DESYREL) 150 MG tablet 9/26/17 #60. 0 rfs.    carBAMazepine (TEGRETOL) 200 MG tablet  9/26/17  #120. 0 rfs.    Lab Results   Component Value Date    WBC 8.4 09/24/2012     Lab Results   Component Value Date    RBC 4.42 09/24/2012     Lab Results   Component Value Date    HGB 13.5 09/24/2015     Lab Results   Component Value Date    HCT 39.7 09/24/2012     No components found for: MCT  Lab Results   Component Value Date    MCV 90 09/24/2012     Lab Results   Component Value Date    MCH 30.5 09/24/2012     Lab Results   Component Value Date    MCHC 34.0 09/24/2012     Lab Results   Component Value Date    RDW 13.2 09/24/2012     Lab Results   Component Value Date     09/24/2012     TSH   Date Value Ref Range Status   07/29/2016 1.83 0.40 - 4.00 mU/L Final   ]  Lab Results   Component Value Date    AST 19 09/24/2015     Lab Results   Component Value Date    ALT 24 09/26/2017       routed because: QUEtiapine (SEROQUEL) 300 MG tablet ,traZODone (DESYREL) 150 MG tablet, carBAMazepine (TEGRETOL) 200 MG tablet     pt requests 90 day supply. Labs for tegretol past due.  Pt requests 90 day supply. rf  or  have new psyche DR fill?

## 2017-10-02 ENCOUNTER — TELEPHONE (OUTPATIENT)
Dept: GASTROENTEROLOGY | Facility: CLINIC | Age: 57
End: 2017-10-02

## 2017-10-02 ENCOUNTER — TELEPHONE (OUTPATIENT)
Dept: INTERNAL MEDICINE | Facility: CLINIC | Age: 57
End: 2017-10-02

## 2017-10-02 DIAGNOSIS — Z11.6 SCREENING EXAMINATION FOR INTESTINAL HELMINTHIASIS: Primary | ICD-10-CM

## 2017-10-02 RX ORDER — TRAZODONE HYDROCHLORIDE 150 MG/1
TABLET ORAL
Qty: 180 TABLET | Refills: 0 | OUTPATIENT
Start: 2017-10-02

## 2017-10-02 RX ORDER — CARBAMAZEPINE 200 MG/1
TABLET ORAL
Qty: 360 TABLET | Refills: 0 | OUTPATIENT
Start: 2017-10-02

## 2017-10-02 RX ORDER — QUETIAPINE FUMARATE 300 MG/1
300 TABLET, FILM COATED ORAL AT BEDTIME
Qty: 90 TABLET | Refills: 0 | OUTPATIENT
Start: 2017-10-02

## 2017-10-02 NOTE — TELEPHONE ENCOUNTER
Pt called today, stated that she took the treatment for the worm on Thursday and noticed worm in her stool this morning.  Pt is wondering if she should have another treatment ?  Please advise.  Latasha Wetzel RN

## 2017-10-03 NOTE — TELEPHONE ENCOUNTER
Nothing to do at this time. If she notes another worm, she should bring it in to lab for identification. Also I would like to do an O&P in 2-3 mos to ensure it's cleared. JBSHARYN  --------------------  Discussed the above message with pt, verbalized understanding.  Latasha Wetzel RN

## 2017-10-26 DIAGNOSIS — F31.70 BIPOLAR AFFECTIVE DISORDER IN REMISSION (H): ICD-10-CM

## 2017-10-27 ENCOUNTER — OFFICE VISIT (OUTPATIENT)
Dept: ORTHOPEDICS | Facility: CLINIC | Age: 57
End: 2017-10-27

## 2017-10-27 ENCOUNTER — OFFICE VISIT (OUTPATIENT)
Dept: INTERNAL MEDICINE | Facility: CLINIC | Age: 57
End: 2017-10-27

## 2017-10-27 VITALS
SYSTOLIC BLOOD PRESSURE: 138 MMHG | HEART RATE: 70 BPM | WEIGHT: 208.4 LBS | BODY MASS INDEX: 33.64 KG/M2 | OXYGEN SATURATION: 98 % | DIASTOLIC BLOOD PRESSURE: 82 MMHG

## 2017-10-27 DIAGNOSIS — F31.70 BIPOLAR AFFECTIVE DISORDER IN REMISSION (H): ICD-10-CM

## 2017-10-27 DIAGNOSIS — M67.911 TENDINOPATHY OF ROTATOR CUFF, RIGHT: Primary | ICD-10-CM

## 2017-10-27 DIAGNOSIS — E11.9 TYPE 2 DIABETES MELLITUS WITHOUT COMPLICATION, WITH LONG-TERM CURRENT USE OF INSULIN (H): ICD-10-CM

## 2017-10-27 DIAGNOSIS — M75.31 CALCIFIC TENDONITIS OF RIGHT SHOULDER REGION: ICD-10-CM

## 2017-10-27 DIAGNOSIS — F51.01 PRIMARY INSOMNIA: ICD-10-CM

## 2017-10-27 DIAGNOSIS — M25.511 RIGHT SHOULDER PAIN, UNSPECIFIED CHRONICITY: ICD-10-CM

## 2017-10-27 DIAGNOSIS — Z79.4 TYPE 2 DIABETES MELLITUS WITHOUT COMPLICATION, WITH LONG-TERM CURRENT USE OF INSULIN (H): ICD-10-CM

## 2017-10-27 DIAGNOSIS — Z98.84 STATUS POST BARIATRIC SURGERY: ICD-10-CM

## 2017-10-27 DIAGNOSIS — S43.421S SPRAIN OF RIGHT ROTATOR CUFF CAPSULE, SEQUELA: Primary | ICD-10-CM

## 2017-10-27 DIAGNOSIS — E66.01 MORBID OBESITY DUE TO EXCESS CALORIES (H): ICD-10-CM

## 2017-10-27 RX ORDER — QUETIAPINE FUMARATE 300 MG/1
300 TABLET, FILM COATED ORAL AT BEDTIME
Qty: 30 TABLET | Refills: 1 | Status: SHIPPED | OUTPATIENT
Start: 2017-10-27 | End: 2017-12-21

## 2017-10-27 RX ORDER — QUETIAPINE FUMARATE 300 MG/1
300 TABLET, FILM COATED ORAL AT BEDTIME
Qty: 30 TABLET | Refills: 1 | Status: SHIPPED | OUTPATIENT
Start: 2017-10-27 | End: 2017-10-27

## 2017-10-27 RX ORDER — CLONAZEPAM 1 MG/1
2 TABLET, ORALLY DISINTEGRATING ORAL AT BEDTIME
Qty: 60 TABLET | Refills: 0 | Status: SHIPPED | OUTPATIENT
Start: 2017-10-27

## 2017-10-27 RX ORDER — CARBAMAZEPINE 200 MG/1
800 TABLET ORAL AT BEDTIME
Qty: 120 TABLET | Refills: 1 | Status: SHIPPED | OUTPATIENT
Start: 2017-10-27 | End: 2018-03-01

## 2017-10-27 RX ORDER — CARBAMAZEPINE 200 MG/1
800 TABLET ORAL AT BEDTIME
Qty: 120 TABLET | Refills: 1 | Status: SHIPPED | OUTPATIENT
Start: 2017-10-27 | End: 2017-10-27

## 2017-10-27 RX ORDER — TRAZODONE HYDROCHLORIDE 150 MG/1
300 TABLET ORAL AT BEDTIME
Qty: 60 TABLET | Refills: 0 | Status: SHIPPED | OUTPATIENT
Start: 2017-10-27 | End: 2017-10-30

## 2017-10-27 RX ORDER — CYANOCOBALAMIN 1000 UG/ML
1 INJECTION, SOLUTION INTRAMUSCULAR; SUBCUTANEOUS
Qty: 1 ML | Refills: 11 | Status: SHIPPED | OUTPATIENT
Start: 2017-10-27

## 2017-10-27 ASSESSMENT — PAIN SCALES - GENERAL: PAINLEVEL: NO PAIN (0)

## 2017-10-27 NOTE — LETTER
10/27/2017       RE: Helena Albright  [address]   [place] [address]     Dear Colleague,    Thank you for referring your patient, Helena Albright, to the [facility] at [facility]. Please see a copy of my visit note below.    CHIEF COMPLAINT:  Consult (Right shoulder pain)       HISTORY OF PRESENT ILLNESS  Ms. Albright is a pleasant 57 year old year old right handed female who presents to clinic today with right shoulder pain.  Helena explains that she fell back in 2007, and had it treated. She reports that it was better until this year.  She reports that shoulder hurts the most while relaxed. Had CSI four months ago with improvement. No reported numbness, tingling, or weakness.    Location: right anterolateral shoulder, anterior arm  Quality:  sharp, shooting and burning intermittent  Duration: 1 years  Severity: 8/10 at worst  Timing: occurs intermittently  Modifying factors:  CSI  Previous similar pain: Yes, Fell in 2007 CSI four months ago    Additional history: as documented    MEDICAL HISTORY  Patient Active Problem List   Diagnosis     Bipolar affective disorder (H)     Personality disorder     CARDIOVASCULAR SCREENING; LDL GOAL LESS THAN 130     Type 2 diabetes, HbA1C goal < 8% (H)     Hypercholesteremia     Excessive weight loss     Pseudophakia, both eyes       Current Outpatient Prescriptions   Medication Sig Dispense Refill     cyanocobalamin (VITAMIN B12) 1000 MCG/ML injection Inject 1 mL (1,000 mcg) into the muscle every 30 days 1 mL 11     carBAMazepine (TEGRETOL) 200 MG tablet Take 4 tablets (800 mg) by mouth At Bedtime 120 tablet 1     clonazePAM (KLONOPIN) 1 MG ODT tab Take 2 tablets (2 mg) by mouth At Bedtime 60 tablet 0     traZODone (DESYREL) 150 MG tablet Take 2 tablets (300 mg) by mouth At Bedtime 60 tablet 0     QUEtiapine (SEROQUEL) 300 MG tablet Take 1 tablet (300 mg) by mouth At Bedtime 30 tablet 1     insulin glargine (LANTUS  SOLOSTAR) 100 UNIT/ML injection Inject 20 Units Subcutaneous At Bedtime 3 mL 3     insulin pen needle (B-D U/F) 31G X 8 MM Use 2 pen needles daily or as directed. 100 each 3     [DISCONTINUED] carBAMazepine (TEGRETOL) 200 MG tablet Take 4 tablets (800 mg) by mouth At Bedtime 120 tablet 1     [DISCONTINUED] QUEtiapine (SEROQUEL) 300 MG tablet Take 1 tablet (300 mg) by mouth At Bedtime 30 tablet 1     oxybutynin (DITROPAN-XL) 5 MG 24 hr tablet Take 1 tablet (5 mg) by mouth daily 90 tablet 3     aspirin 81 MG tablet Take 1 tablet (81 mg) by mouth daily 90 tablet 3     atorvastatin (LIPITOR) 40 MG tablet Take 1 tablet (40 mg) by mouth daily 90 tablet 3     [DISCONTINUED] insulin glargine (LANTUS SOLOSTAR) 100 UNIT/ML injection Inject 20 Units Subcutaneous At Bedtime 3 mL 3     [DISCONTINUED] clonazePAM (KLONOPIN) 1 MG ODT tab Take 2 tablets (2 mg) by mouth At Bedtime 60 tablet 0     [DISCONTINUED] traZODone (DESYREL) 150 MG tablet Take 2 tablets (300 mg) by mouth At Bedtime 60 tablet 0     blood glucose monitoring (NO BRAND SPECIFIED) test strip Use to test blood sugars 4 times daily or as directed 1 Box 11     prednisoLONE acetate (PRED FORTE) 1 % ophthalmic suspension 1 drop in surgical eye as directed, 4x daily after surgery for 1 week, 3x daily for 1 week, 2x daily for 1 week, daily for 1 week, then stop 2 Bottle 1     order for DME Injection Supplies for Vitamin B12: 3cc syringes w/ 27 gauge needles, 1/2 inch length 12 each 0       Allergies   Allergen Reactions     Abilify [Aripiprazole] Nausea and Vomiting and Diarrhea     Lactose Other (See Comments)     earaches     Lithium Diarrhea and Nausea     Soy Allergy Cramps, Diarrhea and GI Disturbance       Family History   Problem Relation Age of Onset     Glaucoma No family hx of      Macular Degeneration No family hx of        Additional medical/Social/Surgical histories reviewed in EPIC and updated as appropriate.     REVIEW OF SYSTEMS  (10/27/2017)  CONSTITUTIONAL: Denies fever and weight loss  EYES: Denies acute vision changes  ENT: Denies hearing changes or difficulty swallowing  CARDIAC: Denies chest pain or edema  RESPIRATORY: Denies dyspnea, cough or wheeze  GASTROINTESTINAL: Denies abdominal pain, nausea, vomiting, does have GERD hx  MUSCULOSKELETAL: See HPI  SKIN: Denies any recent rash or lesion  NEUROLOGICAL: Denies numbness or focal weakness  PSYCHIATRIC: Bipolar d/o  ENDOCRINE: Diabetes on lantus, 7.3  Lab Results   Component Value Date    A1C 7.3 09/26/2017    A1C 8.4 11/28/2016    A1C 7.8 07/29/2016    A1C 7.3 09/24/2015    A1C 9.7 04/04/2012     HEMATOLOGY: Denies episodes of easy bleeding     PHYSICAL EXAM  There were no vitals taken for this visit.    General Appearance: Well appearing, alert, in no acute distress, well-hydrated, and well nourished  Skin: No rashes, lesions, or ecchymosis present  Cardiovascular: no signs of upper or lower extremity edema  Respiratory: no respiratory distress, no audible wheezing, no labored breathing, symmetric thoracic excursion  Psychiatric: mood and affect are appropriate, patient is oriented to time, place and person  Musculoskeletal: Right shoulder  - inspection: normal bone and joint alignment, no obvious deformity, no scapular winging, no AC step-off  - palpation: Moderately tender RC insertion, normal clavicle, non-tender AC  - ROM:  painful and limited flexion, abduction.  Full ER/IR.  - strength: 5/5  strength, 5/5 in all shoulder planes  - special tests:  (-) Speed's  (+) Neer  (+) Hawkin's  (+) Arturo's  (-) [place]'s  (-) apprehension  (-) subscap lift-off  Neuro  - no sensory or motor deficit, grossly normal coordination, normal muscle tone  Skin  - no ecchymosis, erythema, warmth, or induration, no obvious rash  Psych  - interactive, appropriate, normal mood and affect          IMAGING : Right shoulder xr. Final results and radiologist's interpretation, available in the Epic  health record. Images were reviewed with the patient/family members in the office today. My personal interpretation of the performed imaging is calcifications present of rotator cuff on right.  No glenohumeral OA.  Degerative change of ac.     ASSESSMENT & PLAN  Ms. Albright is a 57 year old year old female who presents to clinic today with Consult (Right shoulder pain)  H/o calcific tendonitis diagnosed in [place] and good relief from corticosteroid injection.  Likely reexacerbated in part because a good course of physical therapy was not initiated.    Dx: Calcific tendinitis, rotator cuff tendinopathy    -Start physical therapy  -Activity modifications discussed for rt arm use  -Corticosteroid injection today  -Follow up: 6 weeks after PT; if pain continues, consider referral barbotasergio v other/    It was a pleasure seeing Helena.    Subacromial Bursa - Ultrasound Guided  The patient was informed of the risks and the benefits of the procedure and a written consent was signed.  The patient s shoulder was prepped with chlorhexidine in sterile fashion.   40 mg of triamcinolone suspension was drawn up into a 5 mL syringe with 4 mL of 1% lidocaine.  Injection was performed using sterile technique.  Under ultrasound guidance a 1.5-inch 25-gauge needle was used to enter the subacromial bursa.  Anterolateral approach was used with arm held in Crass position.  Needle placement was visualized and documented with ultrasound.  Ultrasound visualization was necessary to ensure placement in to the bursa and not the rotator cuff tendon which could potentially cause further tendon damage.  Injection performed long axis to the probe.  Injection solution visualized within the joint space.  Images were permanently stored for the patient's record.  There were no complications. The patient tolerated the procedure well. There was negligible bleeding.   The patient was instructed to ice the shoulder upon leaving clinic and refrain from overuse  over the next 3 days.   The patient was instructed to call or go to the emergency room with any unusual pain, swelling, redness, or if otherwise concerned.  A follow up appointment will be scheduled to evaluate response to the injection, and to assess range of motion and pain      Again, thank you for allowing me to participate in the care of your patient.      Sincerely,    Rico Godoy, DO

## 2017-10-27 NOTE — NURSING NOTE
The following medication was given:     MEDICATION: Vitamin B12  1000mcg  ROUTE: IM  SITE: Deltoid - Right  DOSE: 1 ml/1000 mcg  LOT #: [id number]  :  [employer]  EXPIRATION DATE:  09/01/18  NDC#: 66217-620-49     B 12 injection given without problems, patient tolerated procedure well.Josselin Allison LPN 2:06 PM on 10/27/2017

## 2017-10-27 NOTE — PATIENT INSTRUCTIONS
[facility] [place]: [phone]     [facility] Medication Refill Request Information:  * Please contact your pharmacy regarding ANY request for medication refills.  ** [facility] Prescription Fax = [phone]  * Please allow 3 business days for routine medication refills.  * Please allow 5 business days for controlled substance medication refills.     [facility] Test Result notification information:  *You will be notified with in 7-10 days of your appointment day regarding the results of your test.  If you are on MyChart you will be notified as soon as the provider has reviewed the results and signed off on them.

## 2017-10-27 NOTE — PROGRESS NOTES
CHIEF COMPLAINT:  Consult (Right shoulder pain)       HISTORY OF PRESENT ILLNESS  Ms. Albright is a pleasant 57 year old year old right handed female who presents to clinic today with right shoulder pain.  Helena explains that she fell back in 2007, and had it treated. She reports that it was better until this year.  She reports that shoulder hurts the most while relaxed. Had CSI four months ago with improvement. No reported numbness, tingling, or weakness.    Location: right anterolateral shoulder, anterior arm  Quality:  sharp, shooting and burning intermittent  Duration: 1 years  Severity: 8/10 at worst  Timing: occurs intermittently  Modifying factors:  CSI  Previous similar pain: Yes, Fell in 2007 CSI four months ago    Additional history: as documented    MEDICAL HISTORY  Patient Active Problem List   Diagnosis     Bipolar affective disorder (H)     Personality disorder     CARDIOVASCULAR SCREENING; LDL GOAL LESS THAN 130     Type 2 diabetes, HbA1C goal < 8% (H)     Hypercholesteremia     Excessive weight loss     Pseudophakia, both eyes       Current Outpatient Prescriptions   Medication Sig Dispense Refill     cyanocobalamin (VITAMIN B12) 1000 MCG/ML injection Inject 1 mL (1,000 mcg) into the muscle every 30 days 1 mL 11     carBAMazepine (TEGRETOL) 200 MG tablet Take 4 tablets (800 mg) by mouth At Bedtime 120 tablet 1     clonazePAM (KLONOPIN) 1 MG ODT tab Take 2 tablets (2 mg) by mouth At Bedtime 60 tablet 0     traZODone (DESYREL) 150 MG tablet Take 2 tablets (300 mg) by mouth At Bedtime 60 tablet 0     QUEtiapine (SEROQUEL) 300 MG tablet Take 1 tablet (300 mg) by mouth At Bedtime 30 tablet 1     insulin glargine (LANTUS SOLOSTAR) 100 UNIT/ML injection Inject 20 Units Subcutaneous At Bedtime 3 mL 3     insulin pen needle (B-D U/F) 31G X 8 MM Use 2 pen needles daily or as directed. 100 each 3     [DISCONTINUED] carBAMazepine (TEGRETOL) 200 MG tablet Take 4 tablets (800 mg) by mouth At Bedtime 120 tablet 1      [DISCONTINUED] QUEtiapine (SEROQUEL) 300 MG tablet Take 1 tablet (300 mg) by mouth At Bedtime 30 tablet 1     oxybutynin (DITROPAN-XL) 5 MG 24 hr tablet Take 1 tablet (5 mg) by mouth daily 90 tablet 3     aspirin 81 MG tablet Take 1 tablet (81 mg) by mouth daily 90 tablet 3     atorvastatin (LIPITOR) 40 MG tablet Take 1 tablet (40 mg) by mouth daily 90 tablet 3     [DISCONTINUED] insulin glargine (LANTUS SOLOSTAR) 100 UNIT/ML injection Inject 20 Units Subcutaneous At Bedtime 3 mL 3     [DISCONTINUED] clonazePAM (KLONOPIN) 1 MG ODT tab Take 2 tablets (2 mg) by mouth At Bedtime 60 tablet 0     [DISCONTINUED] traZODone (DESYREL) 150 MG tablet Take 2 tablets (300 mg) by mouth At Bedtime 60 tablet 0     blood glucose monitoring (NO BRAND SPECIFIED) test strip Use to test blood sugars 4 times daily or as directed 1 Box 11     prednisoLONE acetate (PRED FORTE) 1 % ophthalmic suspension 1 drop in surgical eye as directed, 4x daily after surgery for 1 week, 3x daily for 1 week, 2x daily for 1 week, daily for 1 week, then stop 2 Bottle 1     order for DME Injection Supplies for Vitamin B12: 3cc syringes w/ 27 gauge needles, 1/2 inch length 12 each 0       Allergies   Allergen Reactions     Abilify [Aripiprazole] Nausea and Vomiting and Diarrhea     Lactose Other (See Comments)     earaches     Lithium Diarrhea and Nausea     Soy Allergy Cramps, Diarrhea and GI Disturbance       Family History   Problem Relation Age of Onset     Glaucoma No family hx of      Macular Degeneration No family hx of        Additional medical/Social/Surgical histories reviewed in [facility] and updated as appropriate.     REVIEW OF SYSTEMS (10/27/2017)  CONSTITUTIONAL: Denies fever and weight loss  EYES: Denies acute vision changes  ENT: Denies hearing changes or difficulty swallowing  CARDIAC: Denies chest pain or edema  RESPIRATORY: Denies dyspnea, cough or wheeze  GASTROINTESTINAL: Denies abdominal pain, nausea, vomiting, does have GERD  hx  MUSCULOSKELETAL: See HPI  SKIN: Denies any recent rash or lesion  NEUROLOGICAL: Denies numbness or focal weakness  PSYCHIATRIC: Bipolar d/o  ENDOCRINE: Diabetes on lantus, 7.3  Lab Results   Component Value Date    A1C 7.3 09/26/2017    A1C 8.4 11/28/2016    A1C 7.8 07/29/2016    A1C 7.3 09/24/2015    A1C 9.7 04/04/2012     HEMATOLOGY: Denies episodes of easy bleeding     PHYSICAL EXAM  There were no vitals taken for this visit.    General Appearance: Well appearing, alert, in no acute distress, well-hydrated, and well nourished  Skin: No rashes, lesions, or ecchymosis present  Cardiovascular: no signs of upper or lower extremity edema  Respiratory: no respiratory distress, no audible wheezing, no labored breathing, symmetric thoracic excursion  Psychiatric: mood and affect are appropriate, patient is oriented to time, place and person  Musculoskeletal: Right shoulder  - inspection: normal bone and joint alignment, no obvious deformity, no scapular winging, no AC step-off  - palpation: Moderately tender RC insertion, normal clavicle, non-tender AC  - ROM:  painful and limited flexion, abduction.  Full ER/IR.  - strength: 5/5  strength, 5/5 in all shoulder planes  - special tests:  (-) Speed's  (+) Neer  (+) Hawkin's  (+) Arturo's  (-) [place]'s  (-) apprehension  (-) subscap lift-off  Neuro  - no sensory or motor deficit, grossly normal coordination, normal muscle tone  Skin  - no ecchymosis, erythema, warmth, or induration, no obvious rash  Psych  - interactive, appropriate, normal mood and affect          IMAGING : Right shoulder xr. Final results and radiologist's interpretation, available in the [facility] health record. Images were reviewed with the patient/family members in the office today. My personal interpretation of the performed imaging is calcifications present of rotator cuff on right.  No glenohumeral OA.  Degerative change of ac.     ASSESSMENT & PLAN  Ms. Albright is a 57 year old year old female who  presents to clinic today with Consult (Right shoulder pain)  H/o calcific tendonitis diagnosed in [place] and good relief from corticosteroid injection.  Likely reexacerbated in part because a good course of physical therapy was not initiated.    Dx: Calcific tendinitis, rotator cuff tendinopathy    -Start physical therapy  -Activity modifications discussed for rt arm use  -Corticosteroid injection today  -Follow up: 6 weeks after PT; if pain continues, consider referral barbotage v other/    It was a pleasure seeing Helena.    Subacromial Bursa - Ultrasound Guided  The patient was informed of the risks and the benefits of the procedure and a written consent was signed.  The patient s shoulder was prepped with chlorhexidine in sterile fashion.   40 mg of triamcinolone suspension was drawn up into a 5 mL syringe with 4 mL of 1% lidocaine.  Injection was performed using sterile technique.  Under ultrasound guidance a 1.5-inch 25-gauge needle was used to enter the subacromial bursa.  Anterolateral approach was used with arm held in Crass position.  Needle placement was visualized and documented with ultrasound.  Ultrasound visualization was necessary to ensure placement in to the bursa and not the rotator cuff tendon which could potentially cause further tendon damage.  Injection performed long axis to the probe.  Injection solution visualized within the joint space.  Images were permanently stored for the patient's record.  There were no complications. The patient tolerated the procedure well. There was negligible bleeding.   The patient was instructed to ice the shoulder upon leaving clinic and refrain from overuse over the next 3 days.   The patient was instructed to call or go to the emergency room with any unusual pain, swelling, redness, or if otherwise concerned.  A follow up appointment will be scheduled to evaluate response to the injection, and to assess range of motion and pain    Rico Godoy DO,  CAQSM  Primary Care Sports Medicine

## 2017-10-27 NOTE — NURSING NOTE
[facility]  [address] [place] [id number]  Dept: [phone]  ______________________________________________________________________________    Patient: Helena Albright   : 1960   MRN: [id number]   2017    INVASIVE PROCEDURE SAFETY CHECKLIST    Date: 10/27/2017   Procedure: Right Shoulder Cortisone Injection  Patient Name: Helena Albright  MRN: [id number]  YOB: 1960    Action: Complete sections as appropriate. Any discrepancy results in a HARD COPY until resolved.     PRE PROCEDURE:  Patient ID verified with 2 identifiers (name and  or MRN): Yes  Procedure and site verified with patient/designee (when able): Yes  Accurate consent documentation in medical record: Yes  H&P (or appropriate assessment) documented in medical record: Yes  H&P must be up to 20 days prior to procedure and updates within 24 hours of procedure as applicable: Yes  Relevant diagnostic and radiology test results appropriately labeled and displayed as applicable: Yes  Procedure site(s) marked with provider initials: Yes    TIMEOUT:  Time-Out performed immediately prior to starting procedure, including verbal and active participation of all team members addressing the following:Yes  * Correct patient identify  * Confirmed that the correct side and site are marked  * An accurate procedure consent form  * Agreement on the procedure to be done  * Correct patient position  * Relevant images and results are properly labeled and appropriately displayed  * The need to administer antibiotics or fluids for irrigation purposes during the procedure as applicable   * Safety precautions based on patient history or medication use    DURING PROCEDURE: Verification of correct person, site, and procedures any time the responsibility for care of the patient is transferred to another member of the care team.     The following medication was given:     MEDICATION:  Kenalog 40  mg  ROUTE: IM  SITE: Right Shoulder  DOSE: 1cc  LOT #: [id number]  : [employer]  EXPIRATION DATE: 03/2019  NDC#: 2085-9680-91   Was there drug waste? No      The following medication was given:     MEDICATION:  Lidocaine without epinephrine  ROUTE: IM  SITE: Right Shoulder  DOSE: 4cc  LOT #: [id number]  : [employer]  EXPIRATION DATE: 06/21  NDC#: 29810-127-39   Was there drug waste? Yes  Amount of drug waste (mL): 16.  Reason for waste:  As per MD Imelda Hoyt MA  October 27, 2017

## 2017-10-27 NOTE — MR AVS SNAPSHOT
After Visit Summary   10/27/2017    Helena Albright    MRN: [id number]           Patient Information     Date Of Birth          1960        Visit Information        Provider Department      10/27/2017 2:20 PM Rico Godoy DO [facility]        Today's Diagnoses     Tendinopathy of rotator cuff, right    -  1    Right shoulder pain, unspecified chronicity        Calcific tendonitis of right shoulder region           Follow-ups after your visit        Additional Services     PHYSICAL THERAPY REFERRAL (External-Prints)       Physical Therapy program for Right Shoulder rotator cuff tear and tendonitis.                  Your next 10 appointments already scheduled     Jan 04, 2018  1:00 PM CST   (Arrive by 12:45 PM)   Return Visit with Deisy Barr MD   [facility] ([facility])    [address]  [address] [facility] [address] [phone]            Mar 19, 2018  1:00 PM CDT   RETURN RETINA with Tabby Leslie MD   Eye Clinic ([facility])    [address]  [address]  [address] [address]  [facility] [address] [phone]              Who to contact     Please call your clinic at [phone] to:    Ask questions about your health    Make or cancel appointments    Discuss your medicines    Learn about your test results    Speak to your doctor   If you have compliments or concerns about an experience at your clinic, or if you wish to file a complaint, please contact [facility] Patient Relations at [phone] or email us at [email or web address]         Additional Information About Your Visit        MyChart Information     [employer] gives you secure access to your electronic health record. If you see a primary care provider, you can also send messages to your care team and make appointments. If you have questions, please call your primary  care clinic.  If you do not have a primary care provider, please call [phone] and they will assist you.      [employer] is an electronic gateway that provides easy, online access to your medical records. With [employer], you can request a clinic appointment, read your test results, renew a prescription or communicate with your care team.     To access your existing account, please contact your [facility] or call [phone] for assistance.        Care EveryWhere ID     This is your Care EveryWhere ID. This could be used by other organizations to access your [place] medical records  [id number]         Blood Pressure from Last 3 Encounters:   10/27/17 138/82   09/26/17 126/87   11/28/16 130/86    Weight from Last 3 Encounters:   10/27/17 94.5 kg (208 lb 6.4 oz)   09/26/17 93.2 kg (205 lb 6.4 oz)   11/28/16 93.9 kg (207 lb)              We Performed the Following     PHYSICAL THERAPY REFERRAL (External-Prints)          Where to get your medicines      These medications were sent to [id number] - [place], [place] - [address] [place] [address] AT [place] [address] [facility] [address]  [address], [place] [address]     Phone:  [phone]     carBAMazepine 200 MG tablet    cyanocobalamin 1000 MCG/ML injection    insulin glargine 100 UNIT/ML injection    insulin pen needle 31G X 8 MM    QUEtiapine 300 MG tablet    traZODone 150 MG tablet         Some of these will need a paper prescription and others can be bought over the counter.  Ask your nurse if you have questions.     Bring a paper prescription for each of these medications     clonazePAM 1 MG ODT tab          Primary Care Provider Office Phone # Fax #    Deisy Barr [phone]       [address] [place] [address] [address] [place] [address]        Equal Access to Services     JAYNE MCBRIDE AH: Trista Hyman, waaxda luqadaha, qaybta kaalmada eunice, andrey tejeda. So  [facility] [phone].    ATENCIÓN: Si sekou seaman, tiene a vines disposición servicios gratuitos de asistencia lingüística. Agustín dolan [phone].    We comply with applicable federal civil rights laws and [place] laws. We do not discriminate on the basis of race, color, national origin, age, disability, sex, sexual orientation, or gender identity.            Thank you!     Thank you for choosing [facility]  for your care. Our goal is always to provide you with excellent care. Hearing back from our patients is one way we can continue to improve our services. Please take a few minutes to complete the written survey that you may receive in the mail after your visit with us. Thank you!             Your Updated Medication List - Protect others around you: Learn how to safely use, store and throw away your medicines at www.disposemymeds.org.          This list is accurate as of: 10/27/17  6:16 PM.  Always use your most recent med list.                   Brand Name Dispense Instructions for use Diagnosis    aspirin 81 MG tablet     90 tablet    Take 1 tablet (81 mg) by mouth daily    Type 2 diabetes mellitus without complication, with long-term current use of insulin (H)       atorvastatin 40 MG tablet    LIPITOR    90 tablet    Take 1 tablet (40 mg) by mouth daily    Type 2 diabetes mellitus without complication, with long-term current use of insulin (H)       blood glucose monitoring test strip    no brand specified    1 Box    Use to test blood sugars 4 times daily or as directed    Type 2 diabetes mellitus without complication, with long-term current use of insulin (H)       carBAMazepine 200 MG tablet    TEGretol    120 tablet    Take 4 tablets (800 mg) by mouth At Bedtime    Bipolar affective disorder in remission (H)       clonazePAM 1 MG ODT tab    klonoPIN    60 tablet    Take 2 tablets (2 mg) by mouth At Bedtime    Bipolar affective disorder in remission (H)       cyanocobalamin 1000 MCG/ML  injection    VITAMIN B12    1 mL    Inject 1 mL (1,000 mcg) into the muscle every 30 days    Status post bariatric surgery       insulin glargine 100 UNIT/ML injection    LANTUS SOLOSTAR    3 mL    Inject 20 Units Subcutaneous At Bedtime    Type 2 diabetes mellitus without complication, with long-term current use of insulin (H)       insulin pen needle 31G X 8 MM    B-D U/F    100 each    Use 2 pen needles daily or as directed.    Type 2 diabetes mellitus without complication, with long-term current use of insulin (H), Morbid obesity due to excess calories (H)       order for DME     12 each    Injection Supplies for Vitamin B12: 3cc syringes w/ 27 gauge needles, 1/2 inch length    Status post bariatric surgery       oxybutynin 5 MG 24 hr tablet    DITROPAN-XL    90 tablet    Take 1 tablet (5 mg) by mouth daily    Urinary urgency       prednisoLONE acetate 1 % ophthalmic susp    PRED FORTE    2 Bottle    1 drop in surgical eye as directed, 4x daily after surgery for 1 week, 3x daily for 1 week, 2x daily for 1 week, daily for 1 week, then stop    Cataracts, both eyes       QUEtiapine 300 MG tablet    SEROquel    30 tablet    Take 1 tablet (300 mg) by mouth At Bedtime    Bipolar affective disorder in remission (H)       traZODone 150 MG tablet    DESYREL    60 tablet    Take 2 tablets (300 mg) by mouth At Bedtime    Primary insomnia

## 2017-10-27 NOTE — NURSING NOTE
Chief Complaint   Patient presents with     Wheezing     Patient is here to discuss wheezing.      Cough     Patient is here to discuss a cough.      Ronda Moreira LPN at 1:10 PM on 10/27/2017.

## 2017-10-27 NOTE — TELEPHONE ENCOUNTER
carBAMazepine       Last Written Prescription Date:  9/26/17  Last Fill Quantity: 120,   # refills: 0  Last Office Visit : 9/26/17  Future Office visit:  1/4/18      QUEtiapine        Last Written Prescription Date:  9/26/17  Last Fill Quantity: 30,   # refills: 0     Routing refill request to provider for review/approval because:  OVER DUE LABS

## 2017-10-27 NOTE — MR AVS SNAPSHOT
After Visit Summary   10/27/2017    Helena Albright    MRN: [id number]           Patient Information     Date Of Birth          1960        Visit Information        Provider Department      10/27/2017 1:30 PM Reece Zavala MD [facility]        Today's Diagnoses     Sprain of right rotator cuff capsule, sequela    -  1    Status post bariatric surgery        Bipolar affective disorder in remission (H)        Primary insomnia        Type 2 diabetes mellitus without complication, with long-term current use of insulin (H)        Morbid obesity due to excess calories (H)          Care Instructions    Primary Care Center: [phone]     Primary Care Center Medication Refill Request Information:  * Please contact your pharmacy regarding ANY request for medication refills.  ** [facility] Prescription Fax = [phone]  * Please allow 3 business days for routine medication refills.  * Please allow 5 business days for controlled substance medication refills.     Primary Care Center Test Result notification information:  *You will be notified with in 7-10 days of your appointment day regarding the results of your test.  If you are on MyChart you will be notified as soon as the provider has reviewed the results and signed off on them.          Follow-ups after your visit        Additional Services     [facility] PT, HAND, AND CHIROPRACTIC REFERRAL                 Your next 10 appointments already scheduled     Jan 04, 2018  1:00 PM CST   (Arrive by 12:45 PM)   Return Visit with Deisy Barr MD   [facility] ([facility])    [address]  [address] [facility] [id number] [phone]            Mar 19, 2018  1:00 PM CDT   RETURN RETINA with Tabby Leslie MD   Eye Clinic ([facility])    [address]  [address]  [address] [address]  [facility] [address] [phone]              Future  tests that were ordered for you today     Open Future Orders        Priority Expected Expires Ordered    [facility] PT, HAND, AND CHIROPRACTIC REFERRAL ASAP 10/27/2017 11/1/2017 10/27/2017            Who to contact     Please call your clinic at [phone] to:    Ask questions about your health    Make or cancel appointments    Discuss your medicines    Learn about your test results    Speak to your doctor   If you have compliments or concerns about an experience at your clinic, or if you wish to file a complaint, please contact [facility] Patient Relations at [phone] or email us at [email or web address]         Additional Information About Your Visit        [employer] Information     [employer] gives you secure access to your electronic health record. If you see a primary care provider, you can also send messages to your care team and make appointments. If you have questions, please call your primary care clinic.  If you do not have a primary care provider, please call [phone] and they will assist you.      [employer] is an electronic gateway that provides easy, online access to your medical records. With [employer], you can request a clinic appointment, read your test results, renew a prescription or communicate with your care team.     To access your existing account, please contact your [facility] or call [phone] for assistance.        Care EveryWhere ID     This is your Care EveryWhere ID. This could be used by other organizations to access your [place] medical records  [id number]        Your Vitals Were     Pulse Pulse Oximetry Breastfeeding? BMI (Body Mass Index)          70 98% No 33.64 kg/m2         Blood Pressure from Last 3 Encounters:   10/27/17 138/82   09/26/17 126/87   11/28/16 130/86    Weight from Last 3 Encounters:   10/27/17 94.5 kg (208 lb 6.4 oz)   09/26/17 93.2 kg (205 lb 6.4 oz)   11/28/16 93.9 kg (207 lb)                  Where to get your medicines      These medications were sent to [id number] - [place], [place] - [address] [facility] AT [place] [address]  [address], [place] [address]     Phone:  [phone]     carBAMazepine 200 MG tablet    cyanocobalamin 1000 MCG/ML injection    insulin glargine 100 UNIT/ML injection    insulin pen needle 31G X 8 MM    QUEtiapine 300 MG tablet    traZODone 150 MG tablet         Some of these will need a paper prescription and others can be bought over the counter.  Ask your nurse if you have questions.     Bring a paper prescription for each of these medications     clonazePAM 1 MG ODT tab          Primary Care Provider Office Phone # Fax #    Deisy Maria D Barr [phone]       [address] [place] [address] [address]  [facility] [address]        Equal Access to Services     JAYNE MCBRIDE : Hadii oleksandr georgeo Sovale, waaxda luqadaha, qaybta kaalmada adeegyada, andrey bermudez . So [facility] [phone].    ATENCIÓN: Si habla español, tiene a vines disposición servicios gratuitos de asistencia lingüística. Llame al [phone].    We comply with applicable federal civil rights laws and [place] laws. We do not discriminate on the basis of race, color, national origin, age, disability, sex, sexual orientation, or gender identity.            Thank you!     Thank you for choosing [facility]  for your care. Our goal is always to provide you with excellent care. Hearing back from our patients is one way we can continue to improve our services. Please take a few minutes to complete the written survey that you may receive in the mail after your visit with us. Thank you!             Your Updated Medication List - Protect others around you: Learn how to safely use, store and throw away your medicines at www.disposemymeds.org.          This list is accurate as of: 10/27/17  1:55 PM.  Always use your most recent med list.                    Brand Name Dispense Instructions for use Diagnosis    aspirin 81 MG tablet     90 tablet    Take 1 tablet (81 mg) by mouth daily    Type 2 diabetes mellitus without complication, with long-term current use of insulin (H)       atorvastatin 40 MG tablet    LIPITOR    90 tablet    Take 1 tablet (40 mg) by mouth daily    Type 2 diabetes mellitus without complication, with long-term current use of insulin (H)       blood glucose monitoring test strip    no brand specified    1 Box    Use to test blood sugars 4 times daily or as directed    Type 2 diabetes mellitus without complication, with long-term current use of insulin (H)       carBAMazepine 200 MG tablet    TEGretol    120 tablet    Take 4 tablets (800 mg) by mouth At Bedtime    Bipolar affective disorder in remission (H)       clonazePAM 1 MG ODT tab    klonoPIN    60 tablet    Take 2 tablets (2 mg) by mouth At Bedtime    Bipolar affective disorder in remission (H)       cyanocobalamin 1000 MCG/ML injection    VITAMIN B12    1 mL    Inject 1 mL (1,000 mcg) into the muscle every 30 days    Status post bariatric surgery       insulin glargine 100 UNIT/ML injection    LANTUS SOLOSTAR    3 mL    Inject 20 Units Subcutaneous At Bedtime    Type 2 diabetes mellitus without complication, with long-term current use of insulin (H)       insulin pen needle 31G X 8 MM    B-D U/F    100 each    Use 2 pen needles daily or as directed.    Type 2 diabetes mellitus without complication, with long-term current use of insulin (H), Morbid obesity due to excess calories (H)       order for DME     12 each    Injection Supplies for Vitamin B12: 3cc syringes w/ 27 gauge needles, 1/2 inch length    Status post bariatric surgery       oxybutynin 5 MG 24 hr tablet    DITROPAN-XL    90 tablet    Take 1 tablet (5 mg) by mouth daily    Urinary urgency       prednisoLONE acetate 1 % ophthalmic susp    PRED FORTE    2 Bottle    1 drop in surgical eye as directed, 4x daily after  surgery for 1 week, 3x daily for 1 week, 2x daily for 1 week, daily for 1 week, then stop    Cataracts, both eyes       QUEtiapine 300 MG tablet    SEROquel    30 tablet    Take 1 tablet (300 mg) by mouth At Bedtime    Bipolar affective disorder in remission (H)       traZODone 150 MG tablet    DESYREL    60 tablet    Take 2 tablets (300 mg) by mouth At Bedtime    Primary insomnia

## 2017-10-28 NOTE — PROGRESS NOTES
PRIMARY CARE CENTER       SUBJECTIVE:  Helena Albright is a 57 year old female with a PMHx of bariatric surgery,d.melllitus,insomnia and Bipolar disorder who comes in for concern for URI sx with wheezing following a Flu shot several days ago and Pain with decreased ROM in her R shoulder.    The respiratory and URI sx are slowly resolving she does note some wheezing  .  She expresses frustration that she can not see a psychiatrist as  expeditiously as she would like.It is her believe that the Seroquel is preventing her form losing weight and the seep medications are no longer effective.She prefers her primary to manage her meds and did ask for refills but did not want adjustment today I agreed .  Her shoulder pain and limitation did respond  To steroid injections in the past and she and I discussed how feculently and at what time these might fail to help.         Medications and allergies reviewed by me today.     ROS:   10 point ROS was negative except for shoulder pain,concern for wt gain and insomnia.    OBJECTIVE:    /82  Pulse 70  Wt 94.5 kg (208 lb 6.4 oz)  SpO2 98%  Breastfeeding? No  BMI 33.64 kg/m2   Wt Readings from Last 1 Encounters:   10/27/17 94.5 kg (208 lb 6.4 oz)     A plasmanate but anxious and somewhat frustrated woman (she anticipated seeing her primary) guarding Her R shoulder from excessive movement   ASSESSMENT/PLAN:  Heent ,ears pharynx benign   Neck supple  Thyroid not appreciated   No CVAT lungs  resounant to P with slight expiratory wheeze  Heart : sounds were distant  No adventitious sounds heard ABD BS present no guarding  EXT  Pain with touch ot movement R shoulder    No joint inflammation hands ,knees ankles  Neuro grossly non focal    Assess and plan:   meds were renewed , She was referred to sports medicine for possible steroid injection shoulder  She was reassured re respiratory status and  Her pas labs were reviewe.   Helena was seen today for  wheezing and cough.    Diagnoses and all orders for this visit:    Sprain of right rotator cuff capsule, sequela  -     Cancel: [facility] PT, HAND, AND CHIROPRACTIC REFERRAL; Future  -     SPORTS MEDICINE REFERRAL    Status post bariatric surgery  -     cyanocobalamin (VITAMIN B12) 1000 MCG/ML injection; Inject 1 mL (1,000 mcg) into the muscle every 30 days    Bipolar affective disorder in remission (H)  -     carBAMazepine (TEGRETOL) 200 MG tablet; Take 4 tablets (800 mg) by mouth At Bedtime  -     clonazePAM (KLONOPIN) 1 MG ODT tab; Take 2 tablets (2 mg) by mouth At Bedtime  -     QUEtiapine (SEROQUEL) 300 MG tablet; Take 1 tablet (300 mg) by mouth At Bedtime    Primary insomnia  -     traZODone (DESYREL) 150 MG tablet; Take 2 tablets (300 mg) by mouth At Bedtime    Type 2 diabetes mellitus without complication, with long-term current use of insulin (H)  -     insulin glargine (LANTUS SOLOSTAR) 100 UNIT/ML injection; Inject 20 Units Subcutaneous At Bedtime  -     insulin pen needle (B-D U/F) 31G X 8 MM; Use 2 pen needles daily or as directed.    Morbid obesity due to excess calories (H)  -     insulin pen needle (B-D U/F) 31G X 8 MM; Use 2 pen needles daily or as directed.              Reece Zavala MD  Oct 27, 2017

## 2017-10-30 DIAGNOSIS — F51.01 PRIMARY INSOMNIA: ICD-10-CM

## 2017-10-31 ENCOUNTER — THERAPY VISIT (OUTPATIENT)
Dept: PHYSICAL THERAPY | Facility: CLINIC | Age: 57
End: 2017-10-31
Payer: MEDICARE

## 2017-10-31 DIAGNOSIS — M25.511 RIGHT SHOULDER PAIN, UNSPECIFIED CHRONICITY: Primary | ICD-10-CM

## 2017-10-31 PROCEDURE — 97530 THERAPEUTIC ACTIVITIES: CPT | Mod: GP | Performed by: PHYSICAL THERAPIST

## 2017-10-31 PROCEDURE — 97161 PT EVAL LOW COMPLEX 20 MIN: CPT | Mod: GP | Performed by: PHYSICAL THERAPIST

## 2017-10-31 PROCEDURE — G8984 CARRY CURRENT STATUS: HCPCS | Mod: GP | Performed by: PHYSICAL THERAPIST

## 2017-10-31 PROCEDURE — G8985 CARRY GOAL STATUS: HCPCS | Mod: GP | Performed by: PHYSICAL THERAPIST

## 2017-10-31 NOTE — PROGRESS NOTES
"KEY PT FINDINGS:  1) Pain with resisted testing of external rotation and abduction  2) Full shoulder motion (pain at end range external rotation)  3) Negative cervical screen    Physical Therapy Initial Evaluation: Subjective History     Injury/Condition Details:  Presenting Complaint Right arm and neck pain   Onset Timing/Date 10/27/2017 (MD appt/referral)   Mechanism In 2007, fell at [employer], the shoulder \"froze up\" and she got an attorey, received physical therapy (tens unit and stretches)  She remembers that she has fallen multiple times in 2016 and 2015 and she caught herself with her right arm.   She traveled to [place] and noticed the pain to be increased when she would lay down. She received an injection in [place] which was very helpful. She has had continued pain and received another injection which has not changed her pain.   She went to the walk in clinic on Friday where she received the injection and more x-rays.      Symptom Behavior Details    Primary Symptoms Constant symptoms; worsen with activity, pain (Location: Right arm (length of the arm, down to the hand), it is on the outside of the arm, moves up from the shoulder into the right sided neck, Quality: Sharp and Aching/Throbbing), weakness, denies numbness/tingling,    Worst Pain 10/10 (with laying down)   Symptom Provocators Sleeping - falls asleep on her side, wakes up on her back.   Reaching, lifting, using her phone   Best Pain 5/10    Symptom Relievers Hold it up   Time of day dependent? More painful at night   Recent symptom change? symptoms worsening     Prior Testing/Intervention for current condition:  Prior Tests  x-ray   Prior Treatment PT  and Injections: 2x (1 helpful, 1 not helpful)     Lifestyle & General Medical History:  Employment On Medical Disability   Usual physical activities  (within past year) Nothing - reports a sedentary lifestyle, plays with her cats, on her phone, watches TV.    Orthopaedic history None   Notable medical " history See Epic Chart Gastric Bypass surgery     Subjective:    HPI                    Objective:    Standing Alignment:    Cervical/Thoracic:  Thoracic kyphosis increased and forward head  Shoulder/UE:  Rounded shoulders, protracted scapula L and protracted scapula R                  Flexibility/Screens:   Positive screens:  ShoulderNegative screens: Cervical   Upper Extremity:    Decreased left upper extremity flexibility at:  Pectoralis Major and Pectoralis Minor    Decreased right upper extremity flexibility present at:  Pectoralis Major and Pectoralis Minor                           Shoulder Evaluation:  ROM:  AROM:  normal            External Rotation:  Right:  Pain at end range over anterior shoulder                      Strength:        Abduction:  Right:/5     Pain:+      External Rotation:   Right:/5     Pain:+                Palpation:    Left shoulder tenderness present at:  Levator and Upper Trap  Right shoulder tenderness present at: Levator and Upper Trap                                     General     ROS    Assessment/Plan:      Patient is a 57 year old female with right side shoulder complaints.    Patient has the following significant findings with corresponding treatment plan.                Diagnosis 1:  Right shoulder pain  Pain -  hot/cold therapy, manual therapy, STS, splint/taping/bracing/orthotics, self management and education  Decreased ROM/flexibility - manual therapy, therapeutic exercise and home program  Decreased strength - therapeutic exercise, therapeutic activities and home program  Impaired muscle performance - neuro re-education and home program  Decreased function - therapeutic activities and home program  Impaired posture - neuro re-education and home program    Therapy Evaluation Codes:   1) History comprised of:   Personal factors that impact the plan of care:      Anxiety, Living environment, Overall behavior pattern and Time since onset of symptoms.    Comorbidity factors  that impact the plan of care are:      Mental illness, Overweight, Pain at night/rest and Weakness.     Medications impacting care: None.  2) Examination of Body Systems comprised of:   Body structures and functions that impact the plan of care:      Shoulder.   Activity limitations that impact the plan of care are:      Lifting and Sleeping.  3) Clinical presentation characteristics are:   Stable/Uncomplicated.  4) Decision-Making    Low complexity using standardized patient assessment instrument and/or measureable assessment of functional outcome.  Cumulative Therapy Evaluation is: Low complexity.    Previous and current functional limitations:  (See Goal Flow Sheet for this information)    Short term and Long term goals: (See Goal Flow Sheet for this information)     Communication ability:  Patient appears to be able to clearly communicate and understand verbal and written communication and follow directions correctly.  Treatment Explanation - The following has been discussed with the patient:   RX ordered/plan of care  Anticipated outcomes  Possible risks and side effects  This patient would benefit from PT intervention to resume normal activities.   Rehab potential is good.    Frequency:  1 X week, once daily  Duration:  for 6 weeks  Discharge Plan:  Achieve all LTG.  Independent in home treatment program.  Reach maximal therapeutic benefit.    Please refer to the daily flowsheet for treatment today, total treatment time and time spent performing 1:1 timed codes.

## 2017-10-31 NOTE — MR AVS SNAPSHOT
After Visit Summary   10/31/2017    Helena Albright    MRN: [id number]           Patient Information     Date Of Birth          1960        Visit Information        Provider Department      10/31/2017 11:40 AM Vita Null PT [facility] Physical Therapy [facility]        Today's Diagnoses     Right shoulder pain, unspecified chronicity    -  1       Follow-ups after your visit        Your next 10 appointments already scheduled     Nov 09, 2017  9:40 AM CST   [place] Extremity with Shelby Ortiz PTA   [facility] Physical Therapy [facility] ([facility])    [address] [place] [id number] [phone]            Nov 16, 2017  9:40 AM CST   [place] Extremity with Vita Null PT   [facility] Physical Therapy [facility] ([facility])    [address] [place] [id number] [phone]            Jan 04, 2018  1:00 PM CST   (Arrive by 12:45 PM)   Return Visit with Deisy Barr MD   [facility] ([facility])    [address] [place] [id number] [phone]            Mar 19, 2018  1:00 PM CDT   RETURN RETINA with Tabby Leslie MD   [facility] ([facility])    [address] [place] [address]  [facility] [id number] [phone]              Who to contact     If you have questions or need follow up information about today's clinic visit or your schedule please contact [facility] PHYSICAL THERAPY [facility] directly at [phone].  Normal or non-critical lab and imaging results will be communicated to you by MyChart, letter or phone within 4 business days after the clinic has received the results. If you do not hear from us within 7 days, please contact the clinic through MyChart or phone. If you have a critical or abnormal lab result, we will notify you by phone as soon as possible.  Submit  refill requests through [employer] or call your pharmacy and they will forward the refill request to us. Please allow 3 business days for your refill to be completed.          Additional Information About Your Visit        [employer] Information     [employer] gives you secure access to your electronic health record. If you see a primary care provider, you can also send messages to your care team and make appointments. If you have questions, please call your primary care clinic.  If you do not have a primary care provider, please call [phone] and they will assist you.        Care EveryWhere ID     This is your Care EveryWhere ID. This could be used by other organizations to access your [facility] medical records  [id number]         Blood Pressure from Last 3 Encounters:   10/27/17 138/82   09/26/17 126/87   11/28/16 130/86    Weight from Last 3 Encounters:   10/27/17 94.5 kg (208 lb 6.4 oz)   09/26/17 93.2 kg (205 lb 6.4 oz)   11/28/16 93.9 kg (207 lb)              We Performed the Following     HC PT EVAL, LOW COMPLEXITY     [facility] CERT REPORT     THERAPEUTIC ACTIVITIES        Primary Care Provider Office Phone # Fax #    Deisy Maria D Barr [phone]       [address] [place] [id number]        Equal Access to Services     JAYNE MCBRIDE : Hadii oleksandr ku hadasho Sotalyaali, waaxda luqadaha, qaybta kaalmada adeegyada, andrey bermudez . So [facility] [phone].    ATENCIÓN: Si habla español, tiene a vines disposición servicios gratuitos de asistencia lingüística. ame al [phone].    We comply with applicable federal civil rights laws and [place] laws. We do not discriminate on the basis of race, color, national origin, age, disability, sex, sexual orientation, or gender identity.            Thank you!     Thank you for choosing [facility] PHYSICAL THERAPY [place]  for your care. Our goal is always to provide you with excellent care. Hearing back from our patients  is one way we can continue to improve our services. Please take a few minutes to complete the written survey that you may receive in the mail after your visit with us. Thank you!             Your Updated Medication List - Protect others around you: Learn how to safely use, store and throw away your medicines at www.disposemymeds.org.          This list is accurate as of: 10/31/17  4:40 PM.  Always use your most recent med list.                   Brand Name Dispense Instructions for use Diagnosis    aspirin 81 MG tablet     90 tablet    Take 1 tablet (81 mg) by mouth daily    Type 2 diabetes mellitus without complication, with long-term current use of insulin (H)       atorvastatin 40 MG tablet    LIPITOR    90 tablet    Take 1 tablet (40 mg) by mouth daily    Type 2 diabetes mellitus without complication, with long-term current use of insulin (H)       blood glucose monitoring test strip    no brand specified    1 Box    Use to test blood sugars 4 times daily or as directed    Type 2 diabetes mellitus without complication, with long-term current use of insulin (H)       carBAMazepine 200 MG tablet    TEGretol    120 tablet    Take 4 tablets (800 mg) by mouth At Bedtime    Bipolar affective disorder in remission (H)       clonazePAM 1 MG ODT tab    klonoPIN    60 tablet    Take 2 tablets (2 mg) by mouth At Bedtime    Bipolar affective disorder in remission (H)       cyanocobalamin 1000 MCG/ML injection    VITAMIN B12    1 mL    Inject 1 mL (1,000 mcg) into the muscle every 30 days    Status post bariatric surgery       insulin glargine 100 UNIT/ML injection    LANTUS SOLOSTAR    3 mL    Inject 20 Units Subcutaneous At Bedtime    Type 2 diabetes mellitus without complication, with long-term current use of insulin (H)       insulin pen needle 31G X 8 MM    B-D U/F    100 each    Use 2 pen needles daily or as directed.    Type 2 diabetes mellitus without complication, with long-term current use of insulin (H), Morbid  obesity due to excess calories (H)       order for DME     12 each    Injection Supplies for Vitamin B12: 3cc syringes w/ 27 gauge needles, 1/2 inch length    Status post bariatric surgery       oxybutynin 5 MG 24 hr tablet    DITROPAN-XL    90 tablet    Take 1 tablet (5 mg) by mouth daily    Urinary urgency       prednisoLONE acetate 1 % ophthalmic susp    PRED FORTE    2 Bottle    1 drop in surgical eye as directed, 4x daily after surgery for 1 week, 3x daily for 1 week, 2x daily for 1 week, daily for 1 week, then stop    Cataracts, both eyes       QUEtiapine 300 MG tablet    SEROquel    30 tablet    Take 1 tablet (300 mg) by mouth At Bedtime    Bipolar affective disorder in remission (H)       traZODone 150 MG tablet    DESYREL    60 tablet    Take 2 tablets (300 mg) by mouth At Bedtime    Primary insomnia

## 2017-10-31 NOTE — LETTER
"DEPARTMENT OF HEALTH AND HUMAN SERVICES  CENTERS FOR MEDICARE & MEDICAID SERVICES    PLAN/UPDATED PLAN OF PROGRESS FOR OUTPATIENT REHABILITATION    PATIENTS NAME:  Helena Albright     : 1960    PROVIDER NUMBER:    [id number]    [id number]    PROVIDER NAME: KEL[facility] PHYSICAL THERAPY [place]    MEDICAL RECORD NUMBER: [id number]     START OF CARE DATE:  SOC Date: 10/31/17   TYPE:  PT    PRIMARY/TREATMENT DIAGNOSIS: (Pertinent Medical Diagnosis)  Right shoulder pain, unspecified chronicity    VISITS FROM START OF CARE:  Rxs Used: 1     KEY PT FINDINGS:  1) Pain with resisted testing of external rotation and abduction  2) Full shoulder motion (pain at end range external rotation)  3) Negative cervical screen    Physical Therapy Initial Evaluation: Subjective History     Injury/Condition Details:  Presenting Complaint Right arm and neck pain   Onset Timing/Date 10/27/2017 (MD appt/referral)   Mechanism In , fell at [employer], the shoulder \"froze up\" and she got an attorey, received physical therapy (tens unit and stretches)  She remembers that she has fallen multiple times in  and  and she caught herself with her right arm.   She traveled to [place] and noticed the pain to be increased when she would lay down. She received an injection in [place] which was very helpful. She has had continued pain and received another injection which has not changed her pain.   She went to the walk in clinic on Friday where she received the injection and more x-rays.                        PATIENTS NAME:  Helena Albright   : 1960    Symptom Behavior Details    Primary Symptoms Constant symptoms; worsen with activity, pain (Location: Right arm (length of the arm, down to the hand), it is on the outside of the arm, moves up from the shoulder into the right sided neck, Quality: Sharp and Aching/Throbbing), weakness, denies numbness/tingling,    Worst Pain 10/10 (with laying down)   Symptom Provocators Sleeping - falls " asleep on her side, wakes up on her back.   Reaching, lifting, using her phone   Best Pain 5/10    Symptom Relievers Hold it up   Time of day dependent? More painful at night   Recent symptom change? symptoms worsening     Prior Testing/Intervention for current condition:  Prior Tests  x-ray   Prior Treatment PT  and Injections: 2x (1 helpful, 1 not helpful)     Lifestyle & General Medical History:  Employment On Medical Disability   Usual physical activities  (within past year) Nothing - reports a sedentary lifestyle, plays with her cats, on her phone, watches TV.    Orthopaedic history None   Notable medical history See Epic Chart Gastric Bypass surgery           Objective:  Standing Alignment:    Cervical/Thoracic:  Thoracic kyphosis increased and forward head  Shoulder/UE:  Rounded shoulders, protracted scapula L and protracted scapula R  Flexibility/Screens:   Positive screens:  ShoulderNegative screens: Cervical   Upper Extremity:    Decreased left upper extremity flexibility at:  Pectoralis Major and Pectoralis Minor  Decreased right upper extremity flexibility present at:  Pectoralis Major and Pectoralis Minor  Shoulder Evaluation:  ROM:  AROM:  Normal  External Rotation:  Right:  Pain at end range over anterior shoulder  Strength:    Abduction:  Right:/5     Pain:+  External Rotation:   Right:/5     Pain:+    Palpation:    Left shoulder tenderness present at:  Levator and Upper Trap  Right shoulder tenderness present at: Levator and Upper Trap          PATIENTS NAME:  Helena Albright   : 1960    Assessment/Plan:    Patient is a 57 year old female with right side shoulder complaints.    Patient has the following significant findings with corresponding treatment plan.                Diagnosis 1:  Right shoulder pain  Pain -  hot/cold therapy, manual therapy, STS, splint/taping/bracing/orthotics, self management and education  Decreased ROM/flexibility - manual therapy, therapeutic exercise and home  program  Decreased strength - therapeutic exercise, therapeutic activities and home program  Impaired muscle performance - neuro re-education and home program  Decreased function - therapeutic activities and home program  Impaired posture - neuro re-education and home program    Therapy Evaluation Codes:   1) History comprised of:   Personal factors that impact the plan of care:      Anxiety, Living environment, Overall behavior pattern and Time since onset of symptoms.    Comorbidity factors that impact the plan of care are:      Mental illness, Overweight, Pain at night/rest and Weakness.     Medications impacting care: None.  2) Examination of Body Systems comprised of:   Body structures and functions that impact the plan of care:      Shoulder.   Activity limitations that impact the plan of care are:      Lifting and Sleeping.  3) Clinical presentation characteristics are:   Stable/Uncomplicated.  4) Decision-Making    Low complexity using standardized patient assessment instrument and/or measureable assessment of functional outcome.  Cumulative Therapy Evaluation is: Low complexity.  Previous and current functional limitations:  (See Goal Flow Sheet for this information)    Short term and Long term goals: (See Goal Flow Sheet for this information)   Communication ability:  Patient appears to be able to clearly communicate and understand verbal and written communication and follow directions correctly.  Treatment Explanation - The following has been discussed with the patient:   RX ordered/plan of care  Anticipated outcomes  Possible risks and side effects  This patient would benefit from PT intervention to resume normal activities.   Rehab potential is good.  Frequency:  1 X week, once daily  Duration:  for 6 weeks  Discharge Plan:  Achieve all LTG.  Independent in home treatment program.  Reach maximal therapeutic benefit.              PATIENTS NAME:  Helena Albright   : 1960            Caregiver  "Signature/Credentials _____________________________ Date ________       Treating Provider: Vita Null DPT, RITA   I have reviewed and certified the need for these services and plan of treatment while under my care.        PHYSICIAN'S SIGNATURE:   _____________________________________  Date___________    Rico Godoy MD    Certification period:  Beginning of Cert date period: 10/31/17 to  End of Cert period date: 01/28/18     Functional Level Progress Report: Please see attached \"Goal Flow sheet for Functional level.\"    ____X____ Continue Services or       ________ DC Services                Service dates: From  SOC Date: 10/31/17 date to present                         "

## 2017-11-01 RX ORDER — TRAZODONE HYDROCHLORIDE 150 MG/1
300 TABLET ORAL AT BEDTIME
Qty: 180 TABLET | Refills: 1 | Status: SHIPPED | OUTPATIENT
Start: 2017-11-01

## 2017-12-06 ENCOUNTER — TELEPHONE (OUTPATIENT)
Dept: INTERNAL MEDICINE | Facility: CLINIC | Age: 57
End: 2017-12-06

## 2017-12-06 DIAGNOSIS — G47.00 INSOMNIA: Primary | ICD-10-CM

## 2017-12-06 NOTE — TELEPHONE ENCOUNTER
Spoke with pt, requesting a referral for sleep study recommended by the psychiatrist.  Referral done, provided phone # for pt to schedule appt.  Pt said her psychiatrist started her on a new medication and requesting to get labs done.  Pt will bring the list of labs requested at her next scheduled appt with .  Latasha Wetzel RN  -------------------       ---- Message from Kailee Luis sent at 12/6/2017  7:55 AM CST -----  Regarding: Pt calling with requests for Dr. Gomez from her Psychiatrist   Contact: [phone]  Pt calling saw her Psychiatrist  Yesterday and per pt Dr. Gomez needs to order a sleep study or a cpap machine. Pt Psychiatrist also has a long list of blood work that needs to be ordered. Pt would like a call back to go over the the lab work and Cpap machine/sleep study orders.   Pt can be reached at [phone]    Thank You,  Kailee    Please DO NOT send this message and/or reply back to sender.  Call Center Representatives DO NOT respond to messages.

## 2017-12-21 ENCOUNTER — OFFICE VISIT (OUTPATIENT)
Dept: SLEEP MEDICINE | Facility: CLINIC | Age: 57
End: 2017-12-21
Attending: INTERNAL MEDICINE
Payer: MEDICARE

## 2017-12-21 VITALS
DIASTOLIC BLOOD PRESSURE: 78 MMHG | HEART RATE: 64 BPM | WEIGHT: 207 LBS | OXYGEN SATURATION: 98 % | BODY MASS INDEX: 33.27 KG/M2 | RESPIRATION RATE: 18 BRPM | HEIGHT: 66 IN | SYSTOLIC BLOOD PRESSURE: 122 MMHG

## 2017-12-21 DIAGNOSIS — F51.04 PSYCHOPHYSIOLOGICAL INSOMNIA: ICD-10-CM

## 2017-12-21 DIAGNOSIS — Z87.898 HISTORY OF SNORING: Primary | ICD-10-CM

## 2017-12-21 DIAGNOSIS — G47.22 CIRCADIAN RHYTHM SLEEP DISORDER, ADVANCED SLEEP PHASE TYPE: ICD-10-CM

## 2017-12-21 PROCEDURE — 99211 OFF/OP EST MAY X REQ PHY/QHP: CPT | Mod: ZF

## 2017-12-21 RX ORDER — MULTIPLE VITAMINS W/ MINERALS TAB 9MG-400MCG
1 TAB ORAL DAILY
COMMUNITY

## 2017-12-21 NOTE — MR AVS SNAPSHOT
After Visit Summary   12/21/2017    Helena Albright    MRN: [id number]           Patient Information     Date Of Birth          1960        Visit Information        Provider Department      12/21/2017 11:00 AM Raven Green APRN CNP [facility], [place], Sleep Study        Care Instructions    2 wks of sleep diaries    Follow up with me in 2 wks for 1 hr appt          Follow-ups after your visit        Follow-up notes from your care team     Return for 1 hr appt-insomnia.      Your next 10 appointments already scheduled     Jan 04, 2018  1:00 PM CST   (Arrive by 12:45 PM)   Return Visit with Deisy Barr MD   [facility] ([facility])    [address]  4th Floor  [place] [address] [phone]            Mar 19, 2018  1:00 PM CDT   RETURN RETINA with Tabby Leslie MD   Eye Clinic ([facility])    [address]  [address]  [address]  [place] [id number] [phone]              Who to contact     If you have questions or need follow up information about today's clinic visit or your schedule please contact [facility] directly at [phone].  Normal or non-critical lab and imaging results will be communicated to you by [employer], letter or phone within 4 business days after the clinic has received the results. If you do not hear from us within 7 days, please contact the clinic through MyChart or phone. If you have a critical or abnormal lab result, we will notify you by phone as soon as possible.  Submit refill requests through [employer] or call your pharmacy and they will forward the refill request to us. Please allow 3 business days for your refill to be completed.          Additional Information About Your Visit        [employer] Information     [employer] gives you secure access to your electronic health record. If you see a primary care provider, you can  "also send messages to your care team and make appointments. If you have questions, please call your primary care clinic.  If you do not have a primary care provider, please call [phone] and they will assist you.        Care EveryWhere ID     This is your Care EveryWhere ID. This could be used by other organizations to access your [place] medical records  [id number]        Your Vitals Were     Pulse Respirations Height Pulse Oximetry BMI (Body Mass Index)       64 18 1.676 m (5' 6\") 98% 33.41 kg/m2        Blood Pressure from Last 3 Encounters:   12/21/17 122/78   10/27/17 138/82   09/26/17 126/87    Weight from Last 3 Encounters:   12/21/17 93.9 kg (207 lb)   10/27/17 94.5 kg (208 lb 6.4 oz)   09/26/17 93.2 kg (205 lb 6.4 oz)              Today, you had the following     No orders found for display       Primary Care Provider Office Phone # Fax #    Deisy Maria D Barr [phone]       [address] [place] [address] [address]  [facility] [address]        Equal Access to Services     [facility]: Hadii aad ku hadasho Sovale, waaxda luqadaha, qaybta kaalmada adeegyada, andrey bermudez . So [facility] [phone].    ATENCIÓN: Si habla español, tiene a vines disposición servicios gratuitos de asistencia lingüística. Llame al [phone].    We comply with applicable federal civil rights laws and [place] laws. We do not discriminate on the basis of race, color, national origin, age, disability, sex, sexual orientation, or gender identity.            Thank you!     Thank you for choosing [facility]  for your care. Our goal is always to provide you with excellent care. Hearing back from our patients is one way we can continue to improve our services. Please take a few minutes to complete the written survey that you may receive in the mail after your visit with us. Thank you!             Your Updated Medication List - Protect others around you: Learn how to safely " use, store and throw away your medicines at www.disposemymeds.org.          This list is accurate as of: 12/21/17 12:13 PM.  Always use your most recent med list.                   Brand Name Dispense Instructions for use Diagnosis    aspirin 81 MG tablet     90 tablet    Take 1 tablet (81 mg) by mouth daily    Type 2 diabetes mellitus without complication, with long-term current use of insulin (H)       blood glucose monitoring test strip    no brand specified    1 Box    Use to test blood sugars 4 times daily or as directed    Type 2 diabetes mellitus without complication, with long-term current use of insulin (H)       carBAMazepine 200 MG tablet    TEGretol    120 tablet    Take 4 tablets (800 mg) by mouth At Bedtime    Bipolar affective disorder in remission (H)       clonazePAM 1 MG ODT tab    klonoPIN    60 tablet    Take 2 tablets (2 mg) by mouth At Bedtime    Bipolar affective disorder in remission (H)       cyanocobalamin 1000 MCG/ML injection    VITAMIN B12    1 mL    Inject 1 mL (1,000 mcg) into the muscle every 30 days    Status post bariatric surgery       insulin glargine 100 UNIT/ML injection    LANTUS SOLOSTAR    3 mL    Inject 20 Units Subcutaneous At Bedtime    Type 2 diabetes mellitus without complication, with long-term current use of insulin (H)       insulin pen needle 31G X 8 MM    B-D U/F    100 each    Use 2 pen needles daily or as directed.    Type 2 diabetes mellitus without complication, with long-term current use of insulin (H), Morbid obesity due to excess calories (H)       Multi-vitamin Tabs tablet      Take 1 tablet by mouth daily        order for DME     12 each    Injection Supplies for Vitamin B12: 3cc syringes w/ 27 gauge needles, 1/2 inch length    Status post bariatric surgery       * RISPERIDONE PO      Take 1 mg by mouth 2 times daily as needed        * RISPERIDONE PO      Take 0.5 mg by mouth daily as needed        traZODone 150 MG tablet    DESYREL    180 tablet    Take 2  tablets (300 mg) by mouth At Bedtime    Primary insomnia       UNABLE TO FIND      MEDICATION NAME: Biotin 10,000        * Notice:  This list has 2 medication(s) that are the same as other medications prescribed for you. Read the directions carefully, and ask your doctor or other care provider to review them with you.

## 2017-12-21 NOTE — PROGRESS NOTES
Allergies:    Allergies   Allergen Reactions     Abilify [Aripiprazole] Nausea and Vomiting and Diarrhea     Lactose Other (See Comments)     earaches     Lithium Diarrhea and Nausea     Soy Allergy Cramps, Diarrhea and GI Disturbance       Medications:    Current Outpatient Prescriptions   Medication Sig Dispense Refill     RISPERIDONE PO Take 1 mg by mouth 2 times daily as needed       RISPERIDONE PO Take 0.5 mg by mouth daily as needed       multivitamin, therapeutic with minerals (MULTI-VITAMIN) TABS tablet Take 1 tablet by mouth daily       UNABLE TO FIND MEDICATION NAME: Biotin 10,000       traZODone (DESYREL) 150 MG tablet Take 2 tablets (300 mg) by mouth At Bedtime 180 tablet 1     cyanocobalamin (VITAMIN B12) 1000 MCG/ML injection Inject 1 mL (1,000 mcg) into the muscle every 30 days 1 mL 11     carBAMazepine (TEGRETOL) 200 MG tablet Take 4 tablets (800 mg) by mouth At Bedtime 120 tablet 1     clonazePAM (KLONOPIN) 1 MG ODT tab Take 2 tablets (2 mg) by mouth At Bedtime 60 tablet 0     insulin glargine (LANTUS SOLOSTAR) 100 UNIT/ML injection Inject 20 Units Subcutaneous At Bedtime 3 mL 3     insulin pen needle (B-D U/F) 31G X 8 MM Use 2 pen needles daily or as directed. 100 each 3     aspirin 81 MG tablet Take 1 tablet (81 mg) by mouth daily 90 tablet 3     blood glucose monitoring (NO BRAND SPECIFIED) test strip Use to test blood sugars 4 times daily or as directed 1 Box 11     order for DME Injection Supplies for Vitamin B12: 3cc syringes w/ 27 gauge needles, 1/2 inch length 12 each 0       Problem List:  Patient Active Problem List    Diagnosis Date Noted     Right shoulder pain 10/31/2017     Priority: Medium     Pseudophakia, both eyes 09/30/2016     Priority: Medium     Excessive weight loss 07/30/2014     Priority: Medium     Type 2 diabetes, HbA1C goal < 8% (H) 12/06/2010     Priority: Medium     Hypercholesteremia 12/06/2010     Priority: Medium     CARDIOVASCULAR SCREENING; LDL GOAL LESS THAN 130  "10/31/2010     Priority: Medium     Bipolar affective disorder (H) 05/17/2010     Priority: Medium     Personality disorder 05/17/2010     Priority: Medium        Past Medical/Surgical History:  Past Medical History:   Diagnosis Date     Chronic infection Hx herpes     Depressive disorder 2001     Diabetes mellitus (H)      PONV (postoperative nausea and vomiting)      S/P gastric bypass      Sleep apnea     present when pt. weighed 317 Lbs, no longer present     Past Surgical History:   Procedure Laterality Date     CHOLECYSTECTOMY       ENT SURGERY      T/ A     ESOPHAGOSCOPY, GASTROSCOPY, DUODENOSCOPY (EGD), COMBINED N/A 9/20/2016    Procedure: COMBINED ESOPHAGOSCOPY, GASTROSCOPY, DUODENOSCOPY (EGD);  Surgeon: Haydee Davison MD;  Location:  GI     GYN SURGERY       HYSTERECTOMY  Age 38    Ovaries remain, cervix removed     LAPAROSCOPIC ASSISTED INSERTION TUBE GASTROTOMY  4/3/2012    Procedure:LAPAROSCOPIC ASSISTED INSERTION TUBE GASTROSTOMY; Upper Endoscopy, Laparoscopic Gastrostomy Tube Placement, Laparoscopic Rhonda-En-Y Gastric Bypass; Surgeon:HAYDEE DAVISON; Location: OR     LAPAROSCOPIC BYPASS GASTRIC  4/3/2012    Procedure:LAPAROSCOPIC BYPASS GASTRIC; Surgeon:HAYDEE DAVISON; Location: OR     PHACOEMULSIFICATION CLEAR CORNEA WITH STANDARD INTRAOCULAR LENS IMPLANT Right 9/23/2016    Procedure: PHACOEMULSIFICATION CLEAR CORNEA WITH STANDARD INTRAOCULAR LENS IMPLANT;  Surgeon: Shanice Krishnan MD;  Location: [facility]     PHACOEMULSIFICATION CLEAR CORNEA WITH STANDARD INTRAOCULAR LENS IMPLANT Left 9/30/2016    Procedure: PHACOEMULSIFICATION CLEAR CORNEA WITH STANDARD INTRAOCULAR LENS IMPLANT;  Surgeon: Shanice Krishnan MD;  Location: [facility]           Physical Examination:  Vitals: /78  Pulse 64  Resp 18  Ht 1.676 m (5' 6\")  Wt 93.9 kg (207 lb)  SpO2 98%  BMI 33.41 kg/m2  BMI= Body mass index is 33.41 kg/(m^2).    Neck Cir (cm): 36 cm    [place] Total Score 12/21/2017   Total score - " [place] 4       GENERAL APPEARANCE: healthy, alert and mild distress  EYES: Eyes grossly normal to inspection  HENT: oropharynx crowded, soft palate dependent and nares with minimal airflow on R (nasal valve collapse)  NECK: thyroid normal to palpation  RESP: lungs clear to auscultation - no rales, rhonchi or wheezes  CV: regular rates and rhythm, normal S1 S2, no S3 or S4 and no murmur, click or rub  Extremities are without clubbing, edema  Musculoskeletal:Moves without difficulty  Mallampati Class: III.  Tonsillar Stage: 0  surgically removed.  Dental: Dentition in good condition, missing lower back molars,  Good advancement of lower jaw without tmd  Skin: without facial rash      CC: Latasha Wetzel

## 2017-12-21 NOTE — NURSING NOTE
"Chief Complaint   Patient presents with     Consult     Discuss not being able to sleep due to medication.       Initial /78  Pulse 64  Resp 18  Ht 1.676 m (5' 6\")  Wt 93.9 kg (207 lb)  SpO2 98%  BMI 33.41 kg/m2 Estimated body mass index is 33.41 kg/(m^2) as calculated from the following:    Height as of this encounter: 1.676 m (5' 6\").    Weight as of this encounter: 93.9 kg (207 lb).  Medication Reconciliation: complete     CAITLYN Conti    "

## 2017-12-22 NOTE — PROGRESS NOTES
DATE OF VISIT:  12/21/2017      CHIEF COMPLAINT:   I have been asked to see Ms. Helena Albright by provider Rashard for problems with sleep when Seroquel has been discontinued and placed on Risperdal, better drug for her bipolar.      HISTORY OF PRESENT ILLNESS:  The patient reports a previous polysomnogram done at [facility].  I am not able to study, however, results of visit with her provider following the study do show mild sleep apnea with an AHI of approximately 8.3, REM AHI of 24.8 and RDI is 11.6 with a total sleep time of 449.5 minutes, sleep latency of 6.5 minutes, REM latency of 177 minutes, sleep efficiency 90%, and snoring was soft to moderate, no periodic limb movements, O2 sat tiffany of 84%.  She was provided with CPAP and was unable to actually sleep on the device.  She had a problem with her mask and was told she should need to pay more for a mask that she felt would work better for her,  went on to have bariatric surgery, lost 110 pounds, and returns here today.  Since surgery there has been some snoring but no evidence of snort or gasping arousals, apneas or even awakening with a dry throat.  She does occasionally have trouble breathing through her nose.  She sleeps in all positions and does report she has trouble  her dreams from real life, however, unlikely she has hypnagogic hallucinations.      Sleep schedule currently off of the Seroquel: with wind down time after 5, she will watch the news and then eventually lie down on the couch somewhere between 7:00 and 8:00 p.m. and fall asleep for approximately 3-4 hours.  She will wake up take her meds fall back asleep but unable to stay asleep for but perhaps another 2-3 hours.  While on Seroquel in the past, she would she would wind down as before, get into bed and then sleep for another 10-11 hours.  Sleep latency takes about 5 minutes, wakes up 2-3 times, can re-fall back asleep but only until about 1:30 a.m.  She is uncertain what wakes  her up, denies any gasping, choking arousals or classic symptoms of the sleep apnea-hypopnea syndrome.  Total sleep time is about 8:00 hours.  She feels her best if she gets 10 and may nap 2 or 3 times a week for about an hour from which she does feel refreshed.  No screens or distractions from sleep, no sleep disruptions, such as restless legs.      SOCIAL AND PERSONAL AND FAMILY HISTORY:  She is , lives alone and is disabled due to her bipolar personality disorder.  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  Sleep environment can be noisy and this may awaken her and she may have difficulty re-falling back asleep.  She is unaware of family history.        SUBSTANCES THAT AFFECT SLEEP:  No alcohol, marijuana, medications that assist with falling or staying asleep over-the-counter.  Minimal caffeine intake early in the day.      [place] Sleepiness score today is 4/24 and 30/30 days she is tired and fatigued, 30/30 days her mental health is not good.      REVIEW OF SYSTEMS:  A 14-point comprehensive review of systems completed and negative with the exception of the following:   CONSTITUTIONAL:  Weight is down 110 pounds from surgery and she has some occasional night sweats.  She has had a hysterectomy but is now going through menopause.   ALLERGY:  Lithium.   EYES:  Some changes in vision.   PULMONARY:  Dry cough at times and shortness of breath with activity but can do 4 flights of stairs without stopping, with an upper respiratory infection, may wheeze.   DIGESTIVE:  Some constipation.   URINARY TRACT:   Urinates more than normal but not at night.   ENDOCRINE:  Has diabetes, blood sugar is under 100.   MENTAL HEALTH:  Depression, anxiety and other health issues.      Allergies:    Allergies   Allergen Reactions     Abilify [Aripiprazole] Nausea and Vomiting and Diarrhea     Lactose Other (See Comments)     earaches     Lithium Diarrhea and Nausea     Soy Allergy  Cramps, Diarrhea and GI Disturbance       Medications:    Current Outpatient Prescriptions   Medication Sig Dispense Refill     RISPERIDONE PO Take 1 mg by mouth 2 times daily as needed       RISPERIDONE PO Take 0.5 mg by mouth daily as needed       multivitamin, therapeutic with minerals (MULTI-VITAMIN) TABS tablet Take 1 tablet by mouth daily       UNABLE TO FIND MEDICATION NAME: Biotin 10,000       traZODone (DESYREL) 150 MG tablet Take 2 tablets (300 mg) by mouth At Bedtime 180 tablet 1     cyanocobalamin (VITAMIN B12) 1000 MCG/ML injection Inject 1 mL (1,000 mcg) into the muscle every 30 days 1 mL 11     carBAMazepine (TEGRETOL) 200 MG tablet Take 4 tablets (800 mg) by mouth At Bedtime 120 tablet 1     clonazePAM (KLONOPIN) 1 MG ODT tab Take 2 tablets (2 mg) by mouth At Bedtime 60 tablet 0     insulin glargine (LANTUS SOLOSTAR) 100 UNIT/ML injection Inject 20 Units Subcutaneous At Bedtime 3 mL 3     insulin pen needle (B-D U/F) 31G X 8 MM Use 2 pen needles daily or as directed. 100 each 3     aspirin 81 MG tablet Take 1 tablet (81 mg) by mouth daily 90 tablet 3     blood glucose monitoring (NO BRAND SPECIFIED) test strip Use to test blood sugars 4 times daily or as directed 1 Box 11     order for DME Injection Supplies for Vitamin B12: 3cc syringes w/ 27 gauge needles, 1/2 inch length 12 each 0       Problem List:  Patient Active Problem List    Diagnosis Date Noted     Right shoulder pain 10/31/2017     Priority: Medium     Pseudophakia, both eyes 09/30/2016     Priority: Medium     Excessive weight loss 07/30/2014     Priority: Medium     Type 2 diabetes, HbA1C goal < 8% (H) 12/06/2010     Priority: Medium     Hypercholesteremia 12/06/2010     Priority: Medium     CARDIOVASCULAR SCREENING; LDL GOAL LESS THAN 130 10/31/2010     Priority: Medium     Bipolar affective disorder (H) 05/17/2010     Priority: Medium     Personality disorder 05/17/2010     Priority: Medium        Past Medical/Surgical History:  Past  "Medical History:   Diagnosis Date     Chronic infection Hx herpes     Depressive disorder 2001     Diabetes mellitus (H)      PONV (postoperative nausea and vomiting)      S/P gastric bypass      Sleep apnea     present when pt. weighed 317 Lbs, no longer present     Past Surgical History:   Procedure Laterality Date     CHOLECYSTECTOMY       ENT SURGERY      T/ A     ESOPHAGOSCOPY, GASTROSCOPY, DUODENOSCOPY (EGD), COMBINED N/A 9/20/2016    Procedure: COMBINED ESOPHAGOSCOPY, GASTROSCOPY, DUODENOSCOPY (EGD);  Surgeon: Haydee Davison MD;  Location:  GI     GYN SURGERY       HYSTERECTOMY  Age 38    Ovaries remain, cervix removed     LAPAROSCOPIC ASSISTED INSERTION TUBE GASTROTOMY  4/3/2012    Procedure:LAPAROSCOPIC ASSISTED INSERTION TUBE GASTROSTOMY; Upper Endoscopy, Laparoscopic Gastrostomy Tube Placement, Laparoscopic Rhonda-En-Y Gastric Bypass; Surgeon:HAYDEE DAVISON; Location: OR     LAPAROSCOPIC BYPASS GASTRIC  4/3/2012    Procedure:LAPAROSCOPIC BYPASS GASTRIC; Surgeon:HAYDEE DAVISON; Location: OR     PHACOEMULSIFICATION CLEAR CORNEA WITH STANDARD INTRAOCULAR LENS IMPLANT Right 9/23/2016    Procedure: PHACOEMULSIFICATION CLEAR CORNEA WITH STANDARD INTRAOCULAR LENS IMPLANT;  Surgeon: Shanice Krishnan MD;  Location: [facility]     PHACOEMULSIFICATION CLEAR CORNEA WITH STANDARD INTRAOCULAR LENS IMPLANT Left 9/30/2016    Procedure: PHACOEMULSIFICATION CLEAR CORNEA WITH STANDARD INTRAOCULAR LENS IMPLANT;  Surgeon: Shanice Krishnan MD;  Location: [facility]           Physical Examination:  Vitals: /78  Pulse 64  Resp 18  Ht 1.676 m (5' 6\")  Wt 93.9 kg (207 lb)  SpO2 98%  BMI 33.41 kg/m2  BMI= Body mass index is 33.41 kg/(m^2).    Neck Cir (cm): 36 cm    [place] Total Score 12/21/2017   Total score - [place] 4       GENERAL APPEARANCE: alert and no distress    ASSESSMENT AND PLAN:  It is my impression that Ms. Albright who had very mild obstructive sleep apnea prior to her gastric bypass, likely does " not have sleep apnea at this point in time.  She lacks any symptoms or even sensations with her wakeups.  She does, however, have a sleep schedule that was reliant upon Seroquel for her sleep and currently is struggling with an advanced sleep phase,  and the following plan of care has been developed:   1.  Snoring, minimal with minimal evidence at this point in time that sleep apnea persists.  We could study at any time; however, at this point aligning a circadian rhythm and improving her sleep schedule may be the priority in this case.  The patient has elected to start with her circadian rhythm and insomnia issues.   2.  Advanced sleep phase disorder.  At this point in time, since her length of her sleep has decreased, she is falling asleep and staying asleep almost for 8 hours but starting sleep at about 7 at night.  We have talked about what it would take to phase delay her which would bright light later in the day to move her back.  Unfortunately she does like light at all.  She finds it very stimulating and tries to maintain a degree of calmness.  To that end we may need to compromise on the amount of light however, keeping herself awake is important at the end of the afternoon.  She will complete a couple weeks of sleep diaries and then return to see me for an hour appointment so that we can set times to enter and exit bed and where light could be timed,  and we will see if we can behaviorally make some changes to move her sleep into a zone that she would be happy with.   3.  Possible insomnia with some anxiety about daytime function if she has poor quality of sleep.  I have discussed the fact that she is getting about the normal amount of time of sleep on some significant medications with her trazodone, clonazepam and then with her Risperdal, which just for her unfortunately is not as sedating as Seroquel.  We will look to see if perhaps her expectations can change with improved alignment of her sleep  scheduling and with brighter light or perhaps bright light therapy that she might actually have a bit more energy without inducing amaury, which is her concern.      Thank you for allowing me to participate in this kind woman's care.      Time spent with patient 60 minutes, of which greater than 50% was spent in counseling, education and coordination of care.         WASHINGTON DE LEÓN, CNP             D: 2017 14:06   T: 2017 06:55   MT:       Name:     KANIKA TREVIÑO   MRN:      [id number]        Account:      [id number]   :      1960           Visit Date:   2017      Document: [id number]

## 2018-01-11 PROBLEM — M25.511 RIGHT SHOULDER PAIN: Status: RESOLVED | Noted: 2017-10-31 | Resolved: 2018-01-11

## 2018-01-11 NOTE — PROGRESS NOTES
Patient seen one time for evaluation and treat. Patient failed to return for follow-up. Please see initial evaluation for discharge information.

## 2018-01-31 DIAGNOSIS — E11.9 DIABETES MELLITUS, TYPE 2 (H): Primary | ICD-10-CM

## 2018-02-07 ENCOUNTER — OFFICE VISIT (OUTPATIENT)
Dept: OPHTHALMOLOGY | Facility: CLINIC | Age: 58
End: 2018-02-07
Attending: OPHTHALMOLOGY
Payer: MEDICARE

## 2018-02-07 DIAGNOSIS — H26.491 PCO (POSTERIOR CAPSULAR OPACIFICATION), RIGHT: Primary | ICD-10-CM

## 2018-02-07 DIAGNOSIS — Z79.4 TYPE 2 DIABETES MELLITUS WITH RIGHT EYE AFFECTED BY MILD NONPROLIFERATIVE RETINOPATHY WITHOUT MACULAR EDEMA, WITH LONG-TERM CURRENT USE OF INSULIN (H): ICD-10-CM

## 2018-02-07 DIAGNOSIS — Z96.1 PSEUDOPHAKIA OF BOTH EYES: ICD-10-CM

## 2018-02-07 DIAGNOSIS — E11.3291 TYPE 2 DIABETES MELLITUS WITH RIGHT EYE AFFECTED BY MILD NONPROLIFERATIVE RETINOPATHY WITHOUT MACULAR EDEMA, WITH LONG-TERM CURRENT USE OF INSULIN (H): ICD-10-CM

## 2018-02-07 DIAGNOSIS — H04.123 DRY EYE SYNDROME, BILATERAL: ICD-10-CM

## 2018-02-07 PROCEDURE — G0463 HOSPITAL OUTPT CLINIC VISIT: HCPCS | Mod: ZF

## 2018-02-07 PROCEDURE — 92015 DETERMINE REFRACTIVE STATE: CPT | Mod: GY,ZF

## 2018-02-07 PROCEDURE — 92134 CPTRZ OPH DX IMG PST SGM RTA: CPT | Mod: ZF | Performed by: OPHTHALMOLOGY

## 2018-02-07 PROCEDURE — 66821 AFTER CATARACT LASER SURGERY: CPT | Mod: ZF | Performed by: OPHTHALMOLOGY

## 2018-02-07 RX ORDER — GABAPENTIN 300 MG/1
300 CAPSULE ORAL 3 TIMES DAILY
COMMUNITY

## 2018-02-07 ASSESSMENT — TONOMETRY
OD_IOP_MMHG: 15
IOP_METHOD: TONOPEN
OS_IOP_MMHG: 15
IOP_METHOD: TONOPEN
OD_IOP_MMHG: 9

## 2018-02-07 ASSESSMENT — REFRACTION_WEARINGRX
OS_SPHERE: -0.50
OS_CYLINDER: +0.50
OD_CYLINDER: +0.25
OD_ADD: +1.75
OS_ADD: +1.75
OS_AXIS: 145
OD_SPHERE: PLANO
OD_AXIS: 005

## 2018-02-07 ASSESSMENT — VISUAL ACUITY
METHOD: SNELLEN - LINEAR
OS_CC: 20/20
OD_CC: 20/60
OD_PH_CC: 20/30
CORRECTION_TYPE: GLASSES

## 2018-02-07 ASSESSMENT — SLIT LAMP EXAM - LIDS
COMMENTS: DERMATOCHALASIS
COMMENTS: DERMATOCHALASIS

## 2018-02-07 ASSESSMENT — REFRACTION_MANIFEST
OS_AXIS: 150
OS_ADD: +2.50
OS_SPHERE: -0.50
OD_AXIS: 160
OD_CYLINDER: +0.75
OD_SPHERE: +0.75
OD_AXIS: 160
OD_CYLINDER: +0.75
OS_SPHERE: -0.50
OD_ADD: +2.50
OS_CYLINDER: +1.00
OD_ADD: +2.75
OS_CYLINDER: +1.00
OD_SPHERE: +0.75
OS_AXIS: 150
OS_ADD: +2.75

## 2018-02-07 ASSESSMENT — CUP TO DISC RATIO
OS_RATIO: 0.3
OD_RATIO: 0.2

## 2018-02-07 ASSESSMENT — EXTERNAL EXAM - LEFT EYE: OS_EXAM: NORMAL

## 2018-02-07 ASSESSMENT — CONF VISUAL FIELD
OS_NORMAL: 1
OD_NORMAL: 1

## 2018-02-07 ASSESSMENT — EXTERNAL EXAM - RIGHT EYE: OD_EXAM: NORMAL

## 2018-02-07 NOTE — PROGRESS NOTES
I have confirmed the patient's and reviewed Past Medical History, Past Surgical History, Social History, Family History, Problem List, Medication List and agree with Tech note.    CC: blurry vision right eye > left eye     HPI: Helean Albright is a 57 year old female who presents for diabetic eye exam. Her last eye exam was 9/2017 and now right eye has blurry vision     She is status post gastric bypass surgery and now says she is borderline diabetic.  A1C was 8.4% 11/16. her blood sugar was 150s this am. She is currently on quetiapine/Seroquel for bipolar disorder, which caused her to gain weight recently.       POHx:  None    Assessment & Plan     1. Diabetic Eye Exam   - new signs of diabetic retinopathy mild nonproliferative diabetic retinopathy right eye but no Diabetic macular edema    - continue blood glucose and blood pressure control.  Is using insulin now   - A1C IS 8.4 Nov 2016.    2. Pseudophakia, Both Eyes with posterior capsular opacity (PCO) right eye > left eye    - posterior capsular opacity (PCO) both eyes    - yap capsulotomy od with patient, I did the procedure     3. Refractive Error: Hyperopia with Astigmatism; Presbyopia    4.  Dry eye syndrome    Artificial tears over the counter    Monitor           Follow-Up    Return to clinic six months      Tabby Britton MD PhD.  Professor & Chair.

## 2018-02-07 NOTE — MR AVS SNAPSHOT
After Visit Summary   2/7/2018    Helena Albright    MRN: [id number]           Patient Information     Date Of Birth          1960        Visit Information        Provider Department      2/7/2018 8:15 AM Tabby Leslie MD [facility]        Today's Diagnoses     PCO (posterior capsular opacification), right    -  1    Type 2 diabetes mellitus with right eye affected by mild nonproliferative retinopathy without macular edema, with long-term current use of insulin (H)        Pseudophakia of both eyes        Dry eye syndrome, bilateral           Follow-ups after your visit        Follow-up notes from your care team     Return in about 2 weeks (around 2/21/2018).      Your next 10 appointments already scheduled     Feb 19, 2018  1:15 PM CST   RETURN RETINA with Tabby Leslie MD   Eye Clinic ([facility])    [address] [address] [phone]            Mar 01, 2018  1:00 PM CST   (Arrive by 12:45 PM)   Return Visit with Deisy Barr MD   [facility] ([facility])    [address]  [address] [facility] [address] [phone]              Who to contact     Please call your clinic at [phone] to:    Ask questions about your health    Make or cancel appointments    Discuss your medicines    Learn about your test results    Speak to your doctor   If you have compliments or concerns about an experience at your clinic, or if you wish to file a complaint, please contact [facility] Patient Relations at [phone] or email us at [email or web address]         Additional Information About Your Visit        MyChart Information     [employer] gives you secure access to your electronic health record. If you see a primary care provider, you can also send messages to your care team and make  appointments. If you have questions, please call your primary care clinic.  If you do not have a primary care provider, please call [phone] and they will assist you.      [employer] is an electronic gateway that provides easy, online access to your medical records. With [employer], you can request a clinic appointment, read your test results, renew a prescription or communicate with your care team.     To access your existing account, please contact your [place] [facility] or call [phone] for assistance.        Care EveryWhere ID     This is your Care EveryWhere ID. This could be used by other organizations to access your [place] medical records  [id number]         Blood Pressure from Last 3 Encounters:   12/21/17 122/78   10/27/17 138/82   09/26/17 126/87    Weight from Last 3 Encounters:   12/21/17 93.9 kg (207 lb)   10/27/17 94.5 kg (208 lb 6.4 oz)   09/26/17 93.2 kg (205 lb 6.4 oz)              We Performed the Following     OCT Retina Spectralis OU (both eyes)     YAG Capsulotomy OD (right eye)        Primary Care Provider Office Phone # Fax #    Deisy Maria D Barr [phone]       [address] [place] [address]        Equal Access to Services     JAYNE MCBRIDE : Hadii oleksandr georgeo Sovale, waaxda luqadaha, qaybta kaalmada adeegyada, andrey tejeda. So [facility] [phone].    ATENCIÓN: Si habla español, tiene a vines disposición servicios gratuitos de asistencia lingüística. Ll[facility] [phone].    We comply with applicable federal civil rights laws and [place] laws. We do not discriminate on the basis of race, color, national origin, age, disability, sex, sexual orientation, or gender identity.            Thank you!     Thank you for choosing EYE CLINIC  for your care. Our goal is always to provide you with excellent care. Hearing back from our patients is one way we can continue to improve our services.  Please take a few minutes to complete the written survey that you may receive in the mail after your visit with us. Thank you!             Your Updated Medication List - Protect others around you: Learn how to safely use, store and throw away your medicines at www.disposemymeds.org.          This list is accurate as of 2/7/18  9:08 AM.  Always use your most recent med list.                   Brand Name Dispense Instructions for use Diagnosis    aspirin 81 MG tablet     90 tablet    Take 1 tablet (81 mg) by mouth daily    Type 2 diabetes mellitus without complication, with long-term current use of insulin (H)       blood glucose monitoring lancets     400 each    Test 4 times daily or as directed    Diabetes mellitus, type 2 (H)       blood glucose monitoring test strip    no brand specified    1 Box    Use to test blood sugars 4 times daily or as directed    Type 2 diabetes mellitus without complication, with long-term current use of insulin (H)       carBAMazepine 200 MG tablet    TEGretol    120 tablet    Take 4 tablets (800 mg) by mouth At Bedtime    Bipolar affective disorder in remission (H)       clonazePAM 1 MG ODT tab    klonoPIN    60 tablet    Take 2 tablets (2 mg) by mouth At Bedtime    Bipolar affective disorder in remission (H)       cyanocobalamin 1000 MCG/ML injection    VITAMIN B12    1 mL    Inject 1 mL (1,000 mcg) into the muscle every 30 days    Status post bariatric surgery       gabapentin 300 MG capsule    NEURONTIN     Take 300 mg by mouth 3 times daily        insulin glargine 100 UNIT/ML injection    LANTUS SOLOSTAR    3 mL    Inject 20 Units Subcutaneous At Bedtime    Type 2 diabetes mellitus without complication, with long-term current use of insulin (H)       insulin pen needle 31G X 8 MM    B-D U/F    100 each    Use 2 pen needles daily or as directed.    Type 2 diabetes mellitus without complication, with long-term current use of insulin (H), Morbid obesity due to excess calories (H)        melatonin 5 MG tablet      Take 5 mg by mouth nightly as needed        Multi-vitamin Tabs tablet      Take 1 tablet by mouth daily        order for DME     12 each    Injection Supplies for Vitamin B12: 3cc syringes w/ 27 gauge needles, 1/2 inch length    Status post bariatric surgery       * RISPERIDONE PO      Take 3 mg by mouth At Bedtime        * RISPERIDONE PO      Take 0.5 mg by mouth daily as needed        traZODone 150 MG tablet    DESYREL    180 tablet    Take 2 tablets (300 mg) by mouth At Bedtime    Primary insomnia       UNABLE TO FIND      MEDICATION NAME: Biotin 10,000        * Notice:  This list has 2 medication(s) that are the same as other medications prescribed for you. Read the directions carefully, and ask your doctor or other care provider to review them with you.

## 2018-02-07 NOTE — NURSING NOTE
Chief Complaints and History of Present Illnesses   Patient presents with     Follow Up For     Diabetic Eye Exam     HPI    Affected eye(s):  Both   Symptoms:     Blurred vision   Decreased vision   No flashes   No Dryness   No itching         Do you have eye pain now?:  No      Comments:  Follow up for diabetic eye exam and OCT today.    The patient notes she has spots in her vision with her glasses on.    TITO Pedroza 8:14 AM 02/07/2018

## 2018-02-19 ENCOUNTER — OFFICE VISIT (OUTPATIENT)
Dept: OPHTHALMOLOGY | Facility: CLINIC | Age: 58
End: 2018-02-19
Attending: OPHTHALMOLOGY
Payer: MEDICARE

## 2018-02-19 DIAGNOSIS — H26.492 PCO (POSTERIOR CAPSULAR OPACIFICATION), LEFT: Primary | ICD-10-CM

## 2018-02-19 DIAGNOSIS — H26.491 PCO (POSTERIOR CAPSULAR OPACIFICATION), RIGHT: ICD-10-CM

## 2018-02-19 PROCEDURE — 66821 AFTER CATARACT LASER SURGERY: CPT | Mod: ZF | Performed by: OPHTHALMOLOGY

## 2018-02-19 PROCEDURE — G0463 HOSPITAL OUTPT CLINIC VISIT: HCPCS | Mod: ZF

## 2018-02-19 PROCEDURE — 25000125 ZZHC RX 250: Mod: ZF | Performed by: OPHTHALMOLOGY

## 2018-02-19 RX ADMIN — APRACLONIDINE HYDROCHLORIDE 1 DROP: 10 SOLUTION/ DROPS OPHTHALMIC at 13:26

## 2018-02-19 ASSESSMENT — VISUAL ACUITY
OS_CC: 20/25
CORRECTION_TYPE: GLASSES
OD_CC: 20/25
METHOD: SNELLEN - LINEAR

## 2018-02-19 ASSESSMENT — REFRACTION_WEARINGRX
OS_SPHERE: -0.50
OS_CYLINDER: +0.50
OD_ADD: +1.75
OD_SPHERE: PLANO
OD_AXIS: 005
OS_ADD: +1.75
OS_AXIS: 145
OD_CYLINDER: +0.25

## 2018-02-19 ASSESSMENT — SLIT LAMP EXAM - LIDS
COMMENTS: NORMAL
COMMENTS: NORMAL

## 2018-02-19 ASSESSMENT — TONOMETRY
IOP_METHOD: TONOPEN
OD_IOP_MMHG: 13
OS_IOP_MMHG: 13

## 2018-02-19 ASSESSMENT — CONF VISUAL FIELD
OD_NORMAL: 1
OS_NORMAL: 1

## 2018-02-19 NOTE — MR AVS SNAPSHOT
After Visit Summary   2/19/2018    Helena Albright    MRN: [id number]           Patient Information     Date Of Birth          1960        Visit Information        Provider Department      2/19/2018 1:15 PM Tabby Leslie MD [facility]        Today's Diagnoses     PCO (posterior capsular opacification), left    -  1    PCO (posterior capsular opacification), right           Follow-ups after your visit        Follow-up notes from your care team     Return in about 1 week (around 2/26/2018) for Refraction.      Your next 10 appointments already scheduled     Feb 26, 2018  2:15 PM CST   RETURN RETINA with Tabby Leslie MD   Eye Clinic ([facility])    [address]  [address] [address] [phone]            Mar 01, 2018  1:00 PM CST   (Arrive by 12:45 PM)   Return Visit with Deisy Barr MD   [facility] ([facility])    [address]  [address] [facility] [address] [phone]              Who to contact     Please call your clinic at [phone] to:    Ask questions about your health    Make or cancel appointments    Discuss your medicines    Learn about your test results    Speak to your doctor            Additional Information About Your Visit        [employer] Information     [employer] gives you secure access to your electronic health record. If you see a primary care provider, you can also send messages to your care team and make appointments. If you have questions, please call your primary care clinic.  If you do not have a primary care provider, please call [phone] and they will assist you.      [employer] is an electronic gateway that provides easy, online access to your medical records. With [employer], you can request a clinic appointment, read your test results, renew a prescription or communicate with your care team.      To access your existing account, please contact your [facility] or call [phone] for assistance.        Care EveryWhere ID     This is your Care EveryWhere ID. This could be used by other organizations to access your [place] medical records  [id number]         Blood Pressure from Last 3 Encounters:   12/21/17 122/78   10/27/17 138/82   09/26/17 126/87    Weight from Last 3 Encounters:   12/21/17 93.9 kg (207 lb)   10/27/17 94.5 kg (208 lb 6.4 oz)   09/26/17 93.2 kg (205 lb 6.4 oz)              We Performed the Following     YAG Capsulotomy OS (left eye)        Primary Care Provider Office Phone # Fax #    Deisy Maria D Barr [phone]       [address] [place] [address] [address]  [facility] [address]        Equal Access to Services     [facility]: Hadii aad ku hadasho Soomaali, waaxda luqadaha, qaybta kaalmada adeegyada, waxay colettein hayaan cinthia bermudez . So [facility] [phone].    ATENCIÓN: Si habla español, tiene a vines disposición servicios gratuitos de asistencia lingüística. Agustín al [phone].    We comply with applicable federal civil rights laws and [place] laws. We do not discriminate on the basis of race, color, national origin, age, disability, sex, sexual orientation, or gender identity.            Thank you!     Thank you for choosing EYE CLINIC  for your care. Our goal is always to provide you with excellent care. Hearing back from our patients is one way we can continue to improve our services. Please take a few minutes to complete the written survey that you may receive in the mail after your visit with us. Thank you!             Your Updated Medication List - Protect others around you: Learn how to safely use, store and throw away your medicines at www.disposemymeds.org.          This list is accurate as of 2/19/18  1:29 PM.  Always use your most recent med list.                   Brand Name Dispense Instructions for use Diagnosis     aspirin 81 MG tablet     90 tablet    Take 1 tablet (81 mg) by mouth daily    Type 2 diabetes mellitus without complication, with long-term current use of insulin (H)       blood glucose monitoring lancets     400 each    Test 4 times daily or as directed    Diabetes mellitus, type 2 (H)       blood glucose monitoring test strip    no brand specified    1 Box    Use to test blood sugars 4 times daily or as directed    Type 2 diabetes mellitus without complication, with long-term current use of insulin (H)       carBAMazepine 200 MG tablet    TEGretol    120 tablet    Take 4 tablets (800 mg) by mouth At Bedtime    Bipolar affective disorder in remission (H)       clonazePAM 1 MG ODT tab    klonoPIN    60 tablet    Take 2 tablets (2 mg) by mouth At Bedtime    Bipolar affective disorder in remission (H)       cyanocobalamin 1000 MCG/ML injection    VITAMIN B12    1 mL    Inject 1 mL (1,000 mcg) into the muscle every 30 days    Status post bariatric surgery       gabapentin 300 MG capsule    NEURONTIN     Take 300 mg by mouth 3 times daily        insulin glargine 100 UNIT/ML injection    LANTUS SOLOSTAR    3 mL    Inject 20 Units Subcutaneous At Bedtime    Type 2 diabetes mellitus without complication, with long-term current use of insulin (H)       insulin pen needle 31G X 8 MM    B-D U/F    100 each    Use 2 pen needles daily or as directed.    Type 2 diabetes mellitus without complication, with long-term current use of insulin (H), Morbid obesity due to excess calories (H)       melatonin 5 MG tablet      Take 5 mg by mouth nightly as needed        Multi-vitamin Tabs tablet      Take 1 tablet by mouth daily        order for DME     12 each    Injection Supplies for Vitamin B12: 3cc syringes w/ 27 gauge needles, 1/2 inch length    Status post bariatric surgery       * RISPERIDONE PO      Take 3 mg by mouth At Bedtime        * RISPERIDONE PO      Take 0.5 mg by mouth daily as needed        traZODone 150 MG tablet     DESYREL    180 tablet    Take 2 tablets (300 mg) by mouth At Bedtime    Primary insomnia       UNABLE TO FIND      MEDICATION NAME: Biotin 10,000        * Notice:  This list has 2 medication(s) that are the same as other medications prescribed for you. Read the directions carefully, and ask your doctor or other care provider to review them with you.

## 2018-02-19 NOTE — NURSING NOTE
Chief Complaints and History of Present Illnesses   Patient presents with     Post Op (Ophthalmology) Right Eye     1 week s/p yap capsulotomy RE     HPI    Affected eye(s):  Right   Symptoms:        Duration:  2 weeks   Frequency:  Constant       Do you have eye pain now?:  No      Comments:  Pt. States that VA has not improved since yag cap RE.  No c/o comfort BE.  Lorene Prabhakar COT 11:54 AM February 19, 2018

## 2018-02-19 NOTE — PROGRESS NOTES
I have confirmed the patient's and reviewed Past Medical History, Past Surgical History, Social History, Family History, Problem List, Medication List and agree with Tech note.      CC: blurry vision right eye > left eye     HPI: Helena Albrgiht is a 57 year old female who presents for follow up of YAG capsulotomy of right eye. She noes that is very frustrated by her glasses at this time. She notes that she would like to proceed with YAG capsulotomy of the right eye today.    She is status post gastric bypass surgery and now says she is borderline diabetic.  A1C was 8.4% 11/16. her blood sugar was 150s this am. She is currently on quetiapine/Seroquel for bipolar disorder, which caused her to gain weight recently.     POHx:  None    Assessment & Plan     1. Diabetic Eye Exam   - new signs of diabetic retinopathy mild nonproliferative diabetic retinopathy right eye but no 1Diabetic macular edema    - continue blood glucose and blood pressure control.  Is using insulin now   - A1C IS 8.4 Nov 2016.    2. Pseudophakia, Both Eyes with posterior capsular opacity (PCO) right eye > left eye    - posterior capsule open in the right eye   - proceed with YAG capsulotomy in the left eye today     3. Refractive Error: Hyperopia with Astigmatism; Presbyopia    4.  Dry eye syndrome    Artificial tears over the counter    Monitor     Blaine Gilmore MD  Ophthalmology, PGY-3    ATTESTATION:  I have seen and examined the patient with Dr. Gilmore and agree with the findings in this note, as well as the interpretations of the diagnostic tests. I did the procedure     Tabby Britton MD PhD.  Professor & Chair

## 2018-02-22 DIAGNOSIS — Z79.4 TYPE 2 DIABETES MELLITUS WITHOUT COMPLICATION, WITH LONG-TERM CURRENT USE OF INSULIN (H): ICD-10-CM

## 2018-02-22 DIAGNOSIS — E11.9 DIABETES MELLITUS, TYPE 2 (H): ICD-10-CM

## 2018-02-22 DIAGNOSIS — E11.9 TYPE 2 DIABETES MELLITUS WITHOUT COMPLICATION, WITH LONG-TERM CURRENT USE OF INSULIN (H): ICD-10-CM

## 2018-02-26 ENCOUNTER — OFFICE VISIT (OUTPATIENT)
Dept: OPHTHALMOLOGY | Facility: CLINIC | Age: 58
End: 2018-02-26
Attending: OPHTHALMOLOGY
Payer: MEDICARE

## 2018-02-26 DIAGNOSIS — H57.813 PTOSIS OF BOTH EYEBROWS: ICD-10-CM

## 2018-02-26 DIAGNOSIS — Z96.1 PSEUDOPHAKIA OF BOTH EYES: ICD-10-CM

## 2018-02-26 DIAGNOSIS — E11.3291 TYPE 2 DIABETES MELLITUS WITH RIGHT EYE AFFECTED BY MILD NONPROLIFERATIVE RETINOPATHY WITHOUT MACULAR EDEMA, WITH LONG-TERM CURRENT USE OF INSULIN (H): Primary | ICD-10-CM

## 2018-02-26 DIAGNOSIS — Z79.4 TYPE 2 DIABETES MELLITUS WITH RIGHT EYE AFFECTED BY MILD NONPROLIFERATIVE RETINOPATHY WITHOUT MACULAR EDEMA, WITH LONG-TERM CURRENT USE OF INSULIN (H): Primary | ICD-10-CM

## 2018-02-26 PROCEDURE — G0463 HOSPITAL OUTPT CLINIC VISIT: HCPCS | Mod: ZF

## 2018-02-26 ASSESSMENT — CONF VISUAL FIELD
OS_NORMAL: 1
OD_NORMAL: 1

## 2018-02-26 ASSESSMENT — REFRACTION_MANIFEST
OD_ADD: +2.50
OD_CYLINDER: +0.50
OS_CYLINDER: +0.75
OD_AXIS: 004
OS_AXIS: 168
OS_SPHERE: PLANO
OD_SPHERE: +0.50
OS_ADD: +2.50

## 2018-02-26 ASSESSMENT — SLIT LAMP EXAM - LIDS
COMMENTS: NORMAL
COMMENTS: NORMAL

## 2018-02-26 ASSESSMENT — CUP TO DISC RATIO
OD_RATIO: 0.25
OS_RATIO: 0.3

## 2018-02-26 ASSESSMENT — REFRACTION_WEARINGRX
OD_SPHERE: PLANO
OS_AXIS: 145
OS_CYLINDER: +0.50
OD_CYLINDER: +0.25
OD_ADD: +1.75
OD_AXIS: 005
OS_ADD: +1.75
OS_SPHERE: -0.50

## 2018-02-26 ASSESSMENT — TONOMETRY
OD_IOP_MMHG: 15
OS_IOP_MMHG: 15
IOP_METHOD: TONOPEN

## 2018-02-26 ASSESSMENT — VISUAL ACUITY
OS_CC: 20/30
OS_CC+: -1
CORRECTION_TYPE: GLASSES
METHOD: SNELLEN - LINEAR
OD_CC: 20/25

## 2018-02-26 ASSESSMENT — EXTERNAL EXAM - LEFT EYE: OS_EXAM: 1+ BROW PTOSIS

## 2018-02-26 ASSESSMENT — EXTERNAL EXAM - RIGHT EYE: OD_EXAM: 1+ BROW PTOSIS

## 2018-02-26 NOTE — MR AVS SNAPSHOT
After Visit Summary   2/26/2018    Helena Albright    MRN: [id number]           Patient Information     Date Of Birth          1960        Visit Information        Provider Department      2/26/2018 2:15 PM Tabby Leslie MD [facility]        Today's Diagnoses     Type 2 diabetes mellitus with right eye affected by mild nonproliferative retinopathy without macular edema, with long-term current use of insulin (H)    -  1    Pseudophakia of both eyes        Ptosis of both eyebrows           Follow-ups after your visit        Follow-up notes from your care team     Return in about 1 year (around 2/26/2019) for Diabetic exam.      Your next 10 appointments already scheduled     Mar 01, 2018  1:00 PM CST   (Arrive by 12:45 PM)   Return Visit with Deisy Barr MD   [facility] ([facility])    [address] [place] [address] [phone]            Mar 06, 2018  7:30 AM CST   (Arrive by 7:15 AM)   NEW PLASTICS with Freddy Brooks MD   [facility] ([facility])    [address] [place] [address] [phone]              Who to contact     Please call your clinic at [phone] to:    Ask questions about your health    Make or cancel appointments    Discuss your medicines    Learn about your test results    Speak to your doctor            Additional Information About Your Visit        MyChart Information     [employer] gives you secure access to your electronic health record. If you see a primary care provider, you can also send messages to your care team and make appointments. If you have questions, please call your primary care clinic.  If you do not have a primary care provider, please call [phone] and they will assist you.      [employer] is an electronic gateway that provides easy, online access to your medical records. With  [employer], you can request a clinic appointment, read your test results, renew a prescription or communicate with your care team.     To access your existing account, please contact your [facility] or call [phone] for assistance.        Care EveryWhere ID     This is your Care EveryWhere ID. This could be used by other organizations to access your [place] medical records  [id number]         Blood Pressure from Last 3 Encounters:   12/21/17 122/78   10/27/17 138/82   09/26/17 126/87    Weight from Last 3 Encounters:   12/21/17 93.9 kg (207 lb)   10/27/17 94.5 kg (208 lb 6.4 oz)   09/26/17 93.2 kg (205 lb 6.4 oz)              Today, you had the following     No orders found for display       Primary Care Provider Office Phone # Fax #    Deisy Maria D Barr [phone]       [address] [place] [address] [address] [place] [address]        Equal Access to Services     JAYNE MCBRIDE : Hadii aad ku hadasho Soomaali, waaxda luqadaha, qaybta kaalmada adeegyada, waxay idiin hayaan karleg tyler bermudez . So [facility] [phone].    ATENCIÓN: Si habla español, tiene a vines disposición servicios gratuitos de asistencia lingüística. LlWhite [facility] [phone].    We comply with applicable federal civil rights laws and [place] laws. We do not discriminate on the basis of race, color, national origin, age, disability, sex, sexual orientation, or gender identity.            Thank you!     Thank you for choosing EYE CLINIC  for your care. Our goal is always to provide you with excellent care. Hearing back from our patients is one way we can continue to improve our services. Please take a few minutes to complete the written survey that you may receive in the mail after your visit with us. Thank you!             Your Updated Medication List - Protect others around you: Learn how to safely use, store and throw away your medicines at www.disposemymeds.org.          This list is accurate as of  2/26/18  3:30 PM.  Always use your most recent med list.                   Brand Name Dispense Instructions for use Diagnosis    aspirin 81 MG tablet     90 tablet    Take 1 tablet (81 mg) by mouth daily    Type 2 diabetes mellitus without complication, with long-term current use of insulin (H)       blood glucose monitoring lancets     400 each    Test 4 times daily or as directed    Diabetes mellitus, type 2 (H)       blood glucose monitoring test strip    no brand specified    1 Box    Use to test blood sugars 4 times daily or as directed    Type 2 diabetes mellitus without complication, with long-term current use of insulin (H)       carBAMazepine 200 MG tablet    TEGretol    120 tablet    Take 4 tablets (800 mg) by mouth At Bedtime    Bipolar affective disorder in remission (H)       clonazePAM 1 MG ODT tab    klonoPIN    60 tablet    Take 2 tablets (2 mg) by mouth At Bedtime    Bipolar affective disorder in remission (H)       cyanocobalamin 1000 MCG/ML injection    VITAMIN B12    1 mL    Inject 1 mL (1,000 mcg) into the muscle every 30 days    Status post bariatric surgery       gabapentin 300 MG capsule    NEURONTIN     Take 300 mg by mouth 3 times daily        insulin glargine 100 UNIT/ML injection    LANTUS SOLOSTAR    3 mL    Inject 20 Units Subcutaneous At Bedtime    Type 2 diabetes mellitus without complication, with long-term current use of insulin (H)       insulin pen needle 31G X 8 MM    B-D U/F    100 each    Use 2 pen needles daily or as directed.    Type 2 diabetes mellitus without complication, with long-term current use of insulin (H), Morbid obesity due to excess calories (H)       melatonin 5 MG tablet      Take 5 mg by mouth nightly as needed        Multi-vitamin Tabs tablet      Take 1 tablet by mouth daily        order for DME     12 each    Injection Supplies for Vitamin B12: 3cc syringes w/ 27 gauge needles, 1/2 inch length    Status post bariatric surgery       * RISPERIDONE PO       Take 3 mg by mouth At Bedtime        * RISPERIDONE PO      Take 0.5 mg by mouth daily as needed        traZODone 150 MG tablet    DESYREL    180 tablet    Take 2 tablets (300 mg) by mouth At Bedtime    Primary insomnia       UNABLE TO FIND      MEDICATION NAME: Biotin 10,000        * Notice:  This list has 2 medication(s) that are the same as other medications prescribed for you. Read the directions carefully, and ask your doctor or other care provider to review them with you.

## 2018-02-26 NOTE — NURSING NOTE
Chief Complaints and History of Present Illnesses   Patient presents with     Post Op (Ophthalmology) Left Eye     1 week s/p yag cap LE     HPI    Affected eye(s):  Left   Symptoms:        Duration:  1 week   Frequency:  Constant       Do you have eye pain now?:  No      Comments:  Pt. States that VA has improved LE since surgery.   Glasses don't seem to be working right.   Lorene Prabhakar COT 3:00 PM February 26, 2018

## 2018-02-26 NOTE — PROGRESS NOTES
I have confirmed the patient's and reviewed Past Medical History, Past Surgical History, Social History, Family History, Problem List, Medication List and agree with Tech note.    CC: blurry vision with glasses    HPI: patient had yag capsulotomy both eyes in recent weeks      Assessment/plan:   1.  Pseudophakia both eyes    - s/p yag capsulotomy both eyes    - new prescription glasses issued today     2.  Mild ptosis both eyes    - patient request referral to plastics       RTC one year for Diabetes mellitus dilated fundus exam     Tabby Britton MD PhD.  Professor & Chair

## 2018-03-01 ENCOUNTER — OFFICE VISIT (OUTPATIENT)
Dept: INTERNAL MEDICINE | Facility: CLINIC | Age: 58
End: 2018-03-01
Payer: MEDICARE

## 2018-03-01 VITALS
HEART RATE: 66 BPM | SYSTOLIC BLOOD PRESSURE: 112 MMHG | BODY MASS INDEX: 33.25 KG/M2 | DIASTOLIC BLOOD PRESSURE: 73 MMHG | WEIGHT: 206 LBS | RESPIRATION RATE: 16 BRPM

## 2018-03-01 DIAGNOSIS — B37.0 THRUSH: ICD-10-CM

## 2018-03-01 DIAGNOSIS — F31.9 BIPOLAR AFFECTIVE DISORDER, REMISSION STATUS UNSPECIFIED (H): ICD-10-CM

## 2018-03-01 DIAGNOSIS — Z12.11 SPECIAL SCREENING FOR MALIGNANT NEOPLASMS, COLON: ICD-10-CM

## 2018-03-01 DIAGNOSIS — E55.9 VITAMIN D DEFICIENCY: ICD-10-CM

## 2018-03-01 RX ORDER — NYSTATIN 100000/ML
500000 SUSPENSION, ORAL (FINAL DOSE FORM) ORAL 4 TIMES DAILY
Qty: 200 ML | Refills: 1 | Status: SHIPPED | OUTPATIENT
Start: 2018-03-01 | End: 2018-03-11

## 2018-03-01 NOTE — PATIENT INSTRUCTIONS
Primary Care Center Phone Number [phone]  Primary Care Center Medication Refill Request Information:  * Please contact your pharmacy regarding ANY request for medication refills.  ** [facility] = [phone]  * Please allow 3 business days for routine medication refills.  * Please allow 5 business days for controlled substance medication refills.     Primary Care Center Test Result notification information:  *You will be notified with in 7-10 days of your appointment day regarding the results of your test.  If you are on MyChart you will be notified as soon as the provider has reviewed the results and signed off on them.        Treat thrush with nystatin  Schedule mammogram and fasting labs early in the morning.   Schedule colonoscopy.   Schedule appointment with endocrine (diabetes doctors) for diabetes management

## 2018-03-01 NOTE — PROGRESS NOTES
PRIMARY CARE CENTER       SUBJECTIVE:  Helena Albright is a 58 year old female with pmh of Bipolar, T2DM who comes in for f/u. She is doing really well. On different medications now, and anger is in control. Still working on sleeping patterns. Needs labs checked for her psychiatrist.     Has not been taking insulin on a regular basis b/c it makes her gain weight and very hungry. Would like to get her diabetes in better control.     Has fallen a couple times since taking new psych meds (gabapentin, risperidone). Falls backwards.     Is moving back to [place] at the end of October.     Past Medical History:   Diagnosis Date     Chronic infection Hx herpes     Depressive disorder 2001     Diabetes mellitus (H)      PONV (postoperative nausea and vomiting)      S/P gastric bypass      Sleep apnea     present when pt. weighed 317 Lbs, no longer present       Medications and allergies reviewed by me today.       Review Of Systems    10 point ROS of systems including Constitutional, Eyes, Respiratory, Cardiovascular, Pulmonary, Gastroenterology, Genitourinary, Integumentary, Musculoskeletal, Psychiatric were all negative except for pertinent positives noted in my HPI.    OBJECTIVE:    /73  Pulse 66  Resp 16  Wt 93.4 kg (206 lb)  Breastfeeding? No  BMI 33.25 kg/m2   Wt Readings from Last 1 Encounters:   03/01/18 93.4 kg (206 lb)       General: Pleasant female, in NAD  ENT: TMs normal bilaterally, oropharynx clear, MMM; tongue with white coating  Neck:  No LAD, no thyromegaly, no carotid bruits  Resp: lungs CAB  CV: Heart RRR, no MRG  Abd: Soft, NT, ND, nl bowel sounds, no HSM  Ext: WWP, no LE edema  Skin: warm, dry, no rash  Neuro: AOX3, no focal deficits     ASSESSMENT/PLAN:    Helena was seen today for diabetes.    Diagnoses and all orders for this visit:    Uncontrolled type 2 diabetes mellitus without complication, with long-term current use of insulin (H)  -     ENDOCRINOLOGY ADULT  REFERRAL  -     Glucose; Future  -     Lipid panel reflex to direct LDL Fasting; Future  -     Hemoglobin A1c; Future    Bipolar affective disorder, remission status unspecified (H)  -     CBC with platelets differential; Future  -     Hepatic panel; Future  -     Carbamazepine total; Future  -     Vitamin D deficiency screening; Future    Vitamin D deficiency   -     Vitamin D deficiency screening; Future    Thrush  -     nystatin (MYCOSTATIN) 035745 UNIT/ML suspension; Take 5 mLs (500,000 Units) by mouth 4 times daily for 10 days    Special screening for malignant neoplasms, colon  -     GASTROENTEROLOGY ADULT REF PROCEDURE ONLY         Deisy Barr MD  03/01/18

## 2018-03-01 NOTE — MR AVS SNAPSHOT
After Visit Summary   3/1/2018    Helena Albright    MRN: [id number]           Patient Information     Date Of Birth          1960        Visit Information        Provider Department      3/1/2018 1:00 PM Deisy Scott MD [facility]        Today's Diagnoses     Uncontrolled type 2 diabetes mellitus without complication, with long-term current use of insulin (H)    -  1    Bipolar affective disorder, remission status unspecified (H)        Vitamin D deficiency         Thrush        Special screening for malignant neoplasms, colon          Care Instructions    Primary Care Center Phone Number [phone]  Primary Care Center Medication Refill Request Information:  * Please contact your pharmacy regarding ANY request for medication refills.  ** [facility] Prescription Fax = [phone]  * Please allow 3 business days for routine medication refills.  * Please allow 5 business days for controlled substance medication refills.     Primary Care Center Test Result notification information:  *You will be notified with in 7-10 days of your appointment day regarding the results of your test.  If you are on MyChart you will be notified as soon as the provider has reviewed the results and signed off on them.        Treat thrush with nystatin  Schedule mammogram and fasting labs early in the morning.   Schedule colonoscopy.   Schedule appointment with endocrine (diabetes doctors) for diabetes management          Follow-ups after your visit        Additional Services     ENDOCRINOLOGY ADULT REFERRAL       Your provider has referred you to: [facility]: Endocrinology and Diabetes Clinic [facility] [phone]   [email or web address]      Please be aware that coverage of these services is subject to the terms and limitations of your health insurance plan.  Call member services at your health plan with any benefit or coverage questions.       Please bring the following to your appointment:    >>   Any x-rays, CTs or MRIs which have been performed.  Contact the facility where they were done to arrange for  prior to your scheduled appointment.    >>   List of current medications   >>   This referral request   >>   Any documents/labs given to you for this referral            GASTROENTEROLOGY ADULT REF PROCEDURE ONLY       Last Lab Result: Creatinine (mg/dL)       Date                     Value                 09/26/2017               0.62             ----------  Body mass index is 33.25 kg/(m^2).     Needed:  No  Language:  English    Patient will be contacted to schedule procedure.     Please be aware that coverage of these services is subject to the terms and limitations of your health insurance plan.  Call member services at your health plan with any benefit or coverage questions.  Any procedures must be performed at a [place] facility OR coordinated by your clinic's referral office.    Please bring the following with you to your appointment:    (1) Any X-Rays, CTs or MRIs which have been performed.  Contact the facility where they were done to arrange for  prior to your scheduled appointment.    (2) List of current medications   (3) This referral request   (4) Any documents/labs given to you for this referral                  Your next 10 appointments already scheduled     Apr 04, 2018  6:45 AM CDT   LAB with [facility] ([facility])    [address] [place] [address] [phone]           Please do not eat 10-12 hours before your appointment if you are coming in fasting for labs on lipids, cholesterol, or glucose (sugar). This does not apply to pregnant women. Water, hot tea and black coffee (with nothing added) are okay. Do not drink other fluids, diet soda or chew gum.            Apr 04, 2018  7:30 AM CDT   (Arrive by 7:15 AM)   NEW DIABETES with Lo  MD Cory   [facility] ([facility])    [address]  [address] [facility] [address] [phone]              Future tests that were ordered for you today     Open Future Orders        Priority Expected Expires Ordered    CBC with platelets differential Routine 3/1/2018 3/15/2018 3/1/2018    Glucose Routine 3/1/2018 3/1/2019 3/1/2018    Hepatic panel Routine 3/1/2018 3/15/2018 3/1/2018    Carbamazepine total Routine  3/2/2019 3/1/2018    Lipid panel reflex to direct LDL Fasting Routine  3/2/2019 3/1/2018    Hemoglobin A1c Routine  3/2/2019 3/1/2018    Vitamin D deficiency screening Routine  3/2/2019 3/1/2018            Who to contact     Please call your clinic at [phone] to:    Ask questions about your health    Make or cancel appointments    Discuss your medicines    Learn about your test results    Speak to your doctor            Additional Information About Your Visit        [facility] EHR Information     [facility] EHR gives you secure access to your electronic health record. If you see a primary care provider, you can also send messages to your care team and make appointments. If you have questions, please call your primary care clinic.  If you do not have a primary care provider, please call [phone] and they will assist you.      [facility] EHR is an electronic gateway that provides easy, online access to your medical records. With [facility] EHR, you can request a clinic appointment, read your test results, renew a prescription or communicate with your care team.     To access your existing account, please contact your [facility] or call [phone] for assistance.        Care EveryWhere ID     This is your Care EveryWhere ID. This could be used by other organizations to access your [facility] medical records  [id number]        Your Vitals Were     Pulse Respirations Breastfeeding? BMI (Body Mass Index)          66 16 No 33.25 kg/m2          Blood Pressure from Last 3 Encounters:   03/01/18 112/73   12/21/17 122/78   10/27/17 138/82    Weight from Last 3 Encounters:   03/01/18 93.4 kg (206 lb)   12/21/17 93.9 kg (207 lb)   10/27/17 94.5 kg (208 lb 6.4 oz)              We Performed the Following     ENDOCRINOLOGY ADULT REFERRAL     GASTROENTEROLOGY ADULT REF PROCEDURE ONLY          Today's Medication Changes          These changes are accurate as of 3/1/18  1:37 PM.  If you have any questions, ask your nurse or doctor.               Start taking these medicines.        Dose/Directions    nystatin 001667 UNIT/ML suspension   Commonly known as:  MYCOSTATIN   Used for:  Thrush   Started by:  Deisy Scott MD        Dose:  482931 Units   Take 5 mLs (500,000 Units) by mouth 4 times daily for 10 days   Quantity:  200 mL   Refills:  1         Stop taking these medicines if you haven't already. Please contact your care team if you have questions.     carBAMazepine 200 MG tablet   Commonly known as:  TEGretol   Stopped by:  Deisy Scott MD                Where to get your medicines      These medications were sent to [id number] - [place], [place] - [address] AT [address] [facility] [address]  [address], [place] [address]     Phone:  [phone]     nystatin 553447 UNIT/ML suspension                Primary Care Provider Office Phone # Fax #    Deisy Barr [phone]       [address]  [facility] [address]        Equal Access to Services     JIGNA MCBRIDE AH: Hadii aad ku hadasho Soomaali, waaxda luqadaha, qaybta kaalmada adeegyada, waxay colin tejeda. So [facility] [phone].    ATENCIÓN: Si habla español, tiene a vines disposición servicios gratuitos de asistencia lingüística. Agustín al [phone].    We comply with applicable federal civil rights laws and [place] laws. We do not discriminate on the basis of race, color, national origin, age,  disability, sex, sexual orientation, or gender identity.            Thank you!     Thank you for choosing [facility]  for your care. Our goal is always to provide you with excellent care. Hearing back from our patients is one way we can continue to improve our services. Please take a few minutes to complete the written survey that you may receive in the mail after your visit with us. Thank you!             Your Updated Medication List - Protect others around you: Learn how to safely use, store and throw away your medicines at www.disposemymeds.org.          This list is accurate as of 3/1/18  1:37 PM.  Always use your most recent med list.                   Brand Name Dispense Instructions for use Diagnosis    aspirin 81 MG tablet     90 tablet    Take 1 tablet (81 mg) by mouth daily    Type 2 diabetes mellitus without complication, with long-term current use of insulin (H)       blood glucose monitoring lancets     400 each    Test 4 times daily or as directed    Diabetes mellitus, type 2 (H)       blood glucose monitoring test strip    no brand specified    1 Box    Use to test blood sugars 4 times daily or as directed    Type 2 diabetes mellitus without complication, with long-term current use of insulin (H)       clonazePAM 1 MG ODT tab    klonoPIN    60 tablet    Take 2 tablets (2 mg) by mouth At Bedtime    Bipolar affective disorder in remission (H)       cyanocobalamin 1000 MCG/ML injection    VITAMIN B12    1 mL    Inject 1 mL (1,000 mcg) into the muscle every 30 days    Status post bariatric surgery       gabapentin 300 MG capsule    NEURONTIN     Take 300 mg by mouth 3 times daily        insulin glargine 100 UNIT/ML injection    LANTUS SOLOSTAR    3 mL    Inject 20 Units Subcutaneous At Bedtime    Type 2 diabetes mellitus without complication, with long-term current use of insulin (H)       insulin pen needle 31G X 8 MM    B-D U/F    100 each    Use 2 pen needles daily or as directed.     Type 2 diabetes mellitus without complication, with long-term current use of insulin (H), Morbid obesity due to excess calories (H)       melatonin 5 MG tablet      Take 5 mg by mouth nightly as needed        Multi-vitamin Tabs tablet      Take 1 tablet by mouth daily        nystatin 009168 UNIT/ML suspension    MYCOSTATIN    200 mL    Take 5 mLs (500,000 Units) by mouth 4 times daily for 10 days    Thrush       order for DME     12 each    Injection Supplies for Vitamin B12: 3cc syringes w/ 27 gauge needles, 1/2 inch length    Status post bariatric surgery       * RISPERIDONE PO      Take 3 mg by mouth At Bedtime        * RISPERIDONE PO      Take 0.5 mg by mouth daily as needed        traZODone 150 MG tablet    DESYREL    180 tablet    Take 2 tablets (300 mg) by mouth At Bedtime    Primary insomnia       UNABLE TO FIND      MEDICATION NAME: Biotin 10,000        * Notice:  This list has 2 medication(s) that are the same as other medications prescribed for you. Read the directions carefully, and ask your doctor or other care provider to review them with you.

## 2018-03-01 NOTE — NURSING NOTE
Chief Complaint   Patient presents with     Diabetes     pt is here for a diabetic follow up        Phyllis Combs CMA at 12:53 PM on 3/1/2018

## 2018-03-02 ENCOUNTER — TELEPHONE (OUTPATIENT)
Dept: GASTROENTEROLOGY | Facility: CLINIC | Age: 58
End: 2018-03-02

## 2018-03-11 ENCOUNTER — MEDICAL CORRESPONDENCE (OUTPATIENT)
Dept: HEALTH INFORMATION MANAGEMENT | Facility: CLINIC | Age: 58
End: 2018-03-11

## 2018-03-15 DIAGNOSIS — E11.9 TYPE 2 DIABETES MELLITUS WITHOUT COMPLICATION, WITH LONG-TERM CURRENT USE OF INSULIN (H): Primary | ICD-10-CM

## 2018-03-15 DIAGNOSIS — F31.9 BIPOLAR AFFECTIVE DISORDER, REMISSION STATUS UNSPECIFIED (H): ICD-10-CM

## 2018-03-15 DIAGNOSIS — Z79.4 TYPE 2 DIABETES MELLITUS WITHOUT COMPLICATION, WITH LONG-TERM CURRENT USE OF INSULIN (H): Primary | ICD-10-CM

## 2018-03-15 DIAGNOSIS — E55.9 VITAMIN D DEFICIENCY: ICD-10-CM

## 2018-03-15 LAB
ALBUMIN SERPL-MCNC: 3.7 G/DL (ref 3.4–5)
ALP SERPL-CCNC: 172 U/L (ref 40–150)
ALT SERPL W P-5'-P-CCNC: 22 U/L (ref 0–50)
AST SERPL W P-5'-P-CCNC: 20 U/L (ref 0–45)
BASOPHILS # BLD AUTO: 0.1 10E9/L (ref 0–0.2)
BASOPHILS NFR BLD AUTO: 0.7 %
BILIRUB DIRECT SERPL-MCNC: <0.1 MG/DL (ref 0–0.2)
BILIRUB SERPL-MCNC: 0.2 MG/DL (ref 0.2–1.3)
CARBAMAZEPINE SERPL-MCNC: <0.5 MG/L (ref 4–12)
CHOLEST SERPL-MCNC: 184 MG/DL
DEPRECATED CALCIDIOL+CALCIFEROL SERPL-MC: 23 UG/L (ref 20–75)
DIFFERENTIAL METHOD BLD: NORMAL
EOSINOPHIL # BLD AUTO: 0.1 10E9/L (ref 0–0.7)
EOSINOPHIL NFR BLD AUTO: 1.2 %
ERYTHROCYTE [DISTWIDTH] IN BLOOD BY AUTOMATED COUNT: 12.3 % (ref 10–15)
GLUCOSE SERPL-MCNC: 286 MG/DL (ref 70–99)
HBA1C MFR BLD: 9.3 % (ref 4.3–6)
HCT VFR BLD AUTO: 37.7 % (ref 35–47)
HDLC SERPL-MCNC: 39 MG/DL
HGB BLD-MCNC: 12.4 G/DL (ref 11.7–15.7)
IMM GRANULOCYTES # BLD: 0 10E9/L (ref 0–0.4)
IMM GRANULOCYTES NFR BLD: 0.4 %
LDLC SERPL CALC-MCNC: 95 MG/DL
LYMPHOCYTES # BLD AUTO: 1.6 10E9/L (ref 0.8–5.3)
LYMPHOCYTES NFR BLD AUTO: 23.4 %
MCH RBC QN AUTO: 28.4 PG (ref 26.5–33)
MCHC RBC AUTO-ENTMCNC: 32.9 G/DL (ref 31.5–36.5)
MCV RBC AUTO: 87 FL (ref 78–100)
MONOCYTES # BLD AUTO: 0.5 10E9/L (ref 0–1.3)
MONOCYTES NFR BLD AUTO: 7.2 %
NEUTROPHILS # BLD AUTO: 4.6 10E9/L (ref 1.6–8.3)
NEUTROPHILS NFR BLD AUTO: 67.1 %
NONHDLC SERPL-MCNC: 145 MG/DL
NRBC # BLD AUTO: 0 10*3/UL
NRBC BLD AUTO-RTO: 0 /100
PLATELET # BLD AUTO: 220 10E9/L (ref 150–450)
PROT SERPL-MCNC: 7.4 G/DL (ref 6.8–8.8)
RBC # BLD AUTO: 4.36 10E12/L (ref 3.8–5.2)
TRIGL SERPL-MCNC: 253 MG/DL
WBC # BLD AUTO: 6.9 10E9/L (ref 4–11)

## 2018-03-15 PROCEDURE — 82306 VITAMIN D 25 HYDROXY: CPT | Performed by: INTERNAL MEDICINE

## 2018-03-15 PROCEDURE — 80156 ASSAY CARBAMAZEPINE TOTAL: CPT | Performed by: INTERNAL MEDICINE

## 2018-03-28 ENCOUNTER — TELEPHONE (OUTPATIENT)
Dept: INTERNAL MEDICINE | Facility: CLINIC | Age: 58
End: 2018-03-28

## 2018-03-28 DIAGNOSIS — E11.9 TYPE 2 DIABETES, HBA1C GOAL < 8% (H): Primary | ICD-10-CM

## 2018-03-28 RX ORDER — BLOOD-GLUCOSE METER
EACH MISCELLANEOUS
Qty: 1 KIT | Refills: 0 | Status: SHIPPED | OUTPATIENT
Start: 2018-03-28

## 2018-03-28 NOTE — TELEPHONE ENCOUNTER
----- Message from Franky Mccracken sent at 3/28/2018 12:05 PM CDT -----  Regarding: Glucose meter   Contact: [phone]  Pt states she need a new glucose meter.    Was told to call you if it is still not working.    [id number] - [place], [place] - [address] [place] [address] ST AT [facility] [address]

## 2018-03-29 DIAGNOSIS — E11.9 TYPE 2 DIABETES MELLITUS WITHOUT COMPLICATION, WITH LONG-TERM CURRENT USE OF INSULIN (H): ICD-10-CM

## 2018-03-29 DIAGNOSIS — Z79.4 TYPE 2 DIABETES MELLITUS WITHOUT COMPLICATION, WITH LONG-TERM CURRENT USE OF INSULIN (H): ICD-10-CM

## 2018-04-05 ENCOUNTER — MEDICAL CORRESPONDENCE (OUTPATIENT)
Dept: HEALTH INFORMATION MANAGEMENT | Facility: CLINIC | Age: 58
End: 2018-04-05

## 2018-04-13 DIAGNOSIS — E11.9 TYPE 2 DIABETES MELLITUS WITHOUT COMPLICATION, WITH LONG-TERM CURRENT USE OF INSULIN (H): ICD-10-CM

## 2018-04-13 DIAGNOSIS — Z79.4 TYPE 2 DIABETES MELLITUS WITHOUT COMPLICATION, WITH LONG-TERM CURRENT USE OF INSULIN (H): ICD-10-CM

## 2018-04-26 NOTE — TELEPHONE ENCOUNTER
FUTURE VISIT INFORMATION      FUTURE VISIT INFORMATION:    Date:05/08/18    Time: 730am    Location: [facility]  REFERRAL INFORMATION:    Referring provider:  STEPHIE WHITNEY    Referring providers clinic:  [address] [place] [address]    Reason for visit/diagnosis  ptosis per vk    RECORDS REQUESTED FROM:       Clinic name Comments Records Status Imaging Status                                         RECORDS STATUS    Js has notes.

## 2018-05-01 ENCOUNTER — MEDICAL CORRESPONDENCE (OUTPATIENT)
Dept: HEALTH INFORMATION MANAGEMENT | Facility: CLINIC | Age: 58
End: 2018-05-01

## 2018-05-08 ENCOUNTER — PRE VISIT (OUTPATIENT)
Dept: OPHTHALMOLOGY | Facility: CLINIC | Age: 58
End: 2018-05-08

## 2020-03-01 ENCOUNTER — HEALTH MAINTENANCE LETTER (OUTPATIENT)
Age: 60
End: 2020-03-01

## 2020-12-14 ENCOUNTER — HEALTH MAINTENANCE LETTER (OUTPATIENT)
Age: 60
End: 2020-12-14

## 2021-04-17 ENCOUNTER — HEALTH MAINTENANCE LETTER (OUTPATIENT)
Age: 61
End: 2021-04-17

## 2021-08-07 ENCOUNTER — HEALTH MAINTENANCE LETTER (OUTPATIENT)
Age: 61
End: 2021-08-07

## 2021-10-02 ENCOUNTER — HEALTH MAINTENANCE LETTER (OUTPATIENT)
Age: 61
End: 2021-10-02

## 2021-11-27 ENCOUNTER — HEALTH MAINTENANCE LETTER (OUTPATIENT)
Age: 61
End: 2021-11-27

## 2022-03-13 ENCOUNTER — HEALTH MAINTENANCE LETTER (OUTPATIENT)
Age: 62
End: 2022-03-13

## 2022-05-08 ENCOUNTER — HEALTH MAINTENANCE LETTER (OUTPATIENT)
Age: 62
End: 2022-05-08

## 2022-07-03 ENCOUNTER — HEALTH MAINTENANCE LETTER (OUTPATIENT)
Age: 62
End: 2022-07-03

## 2023-01-14 ENCOUNTER — HEALTH MAINTENANCE LETTER (OUTPATIENT)
Age: 63
End: 2023-01-14

## 2023-04-23 ENCOUNTER — HEALTH MAINTENANCE LETTER (OUTPATIENT)
Age: 63
End: 2023-04-23

## 2023-06-02 ENCOUNTER — HEALTH MAINTENANCE LETTER (OUTPATIENT)
Age: 63
End: 2023-06-02

## 2023-09-30 ENCOUNTER — HEALTH MAINTENANCE LETTER (OUTPATIENT)
Age: 63
End: 2023-09-30

## 2024-02-11 ENCOUNTER — HEALTH MAINTENANCE LETTER (OUTPATIENT)
Age: 64
End: 2024-02-11

## (undated) RX ORDER — CYANOCOBALAMIN 1000 UG/ML
INJECTION, SOLUTION INTRAMUSCULAR; SUBCUTANEOUS
Status: DISPENSED
Start: 2017-09-26

## (undated) RX ORDER — TRIAMCINOLONE ACETONIDE 40 MG/ML
INJECTION, SUSPENSION INTRA-ARTICULAR; INTRAMUSCULAR
Status: DISPENSED
Start: 2017-10-27

## (undated) RX ORDER — LIDOCAINE HYDROCHLORIDE 10 MG/ML
INJECTION, SOLUTION INFILTRATION; PERINEURAL
Status: DISPENSED
Start: 2017-10-27

## (undated) RX ORDER — CYANOCOBALAMIN 1000 UG/ML
INJECTION, SOLUTION INTRAMUSCULAR; SUBCUTANEOUS
Status: DISPENSED
Start: 2017-10-27